# Patient Record
Sex: FEMALE | Race: WHITE | Employment: OTHER | URBAN - METROPOLITAN AREA
[De-identification: names, ages, dates, MRNs, and addresses within clinical notes are randomized per-mention and may not be internally consistent; named-entity substitution may affect disease eponyms.]

---

## 2017-02-25 ENCOUNTER — HOSPITAL ENCOUNTER (OUTPATIENT)
Dept: ULTRASOUND IMAGING | Age: 32
Discharge: HOME | End: 2017-02-25
Attending: INTERNAL MEDICINE | Admitting: INTERNAL MEDICINE

## 2017-02-25 DIAGNOSIS — R10.9 ABDOMINAL PAIN, UNSPECIFIED LOCATION: ICD-10-CM

## 2017-08-07 ENCOUNTER — TELEPHONE (OUTPATIENT)
Dept: PAIN MANAGEMENT | Age: 32
End: 2017-08-07

## 2017-10-04 ENCOUNTER — OFFICE VISIT (OUTPATIENT)
Dept: PAIN MANAGEMENT | Age: 32
End: 2017-10-04

## 2017-10-04 VITALS
DIASTOLIC BLOOD PRESSURE: 62 MMHG | HEIGHT: 66 IN | BODY MASS INDEX: 28.28 KG/M2 | SYSTOLIC BLOOD PRESSURE: 120 MMHG | WEIGHT: 176 LBS

## 2017-10-04 DIAGNOSIS — F51.01 PRIMARY INSOMNIA: ICD-10-CM

## 2017-10-04 DIAGNOSIS — G89.4 CHRONIC PAIN SYNDROME: ICD-10-CM

## 2017-10-04 DIAGNOSIS — M79.7 FIBROMYALGIA: ICD-10-CM

## 2017-10-04 DIAGNOSIS — F39 MOOD DISORDER (HCC): ICD-10-CM

## 2017-10-04 PROCEDURE — 99204 OFFICE O/P NEW MOD 45 MIN: CPT | Performed by: INTERNAL MEDICINE

## 2017-10-04 RX ORDER — AMITRIPTYLINE HYDROCHLORIDE 25 MG/1
25 TABLET, FILM COATED ORAL NIGHTLY
Qty: 30 TABLET | Refills: 0 | Status: SHIPPED | OUTPATIENT
Start: 2017-10-04 | End: 2017-12-13

## 2017-10-04 RX ORDER — OXCARBAZEPINE 300 MG/1
TABLET, FILM COATED ORAL
Qty: 90 TABLET | Refills: 0 | Status: SHIPPED | OUTPATIENT
Start: 2017-10-04 | End: 2017-12-13 | Stop reason: SDUPTHER

## 2017-10-04 RX ORDER — NORETHINDRONE ACETATE AND ETHINYL ESTRADIOL 1.5-30(21)
KIT ORAL DAILY
COMMUNITY
End: 2019-07-17 | Stop reason: SDUPTHER

## 2017-10-04 RX ORDER — TIZANIDINE HYDROCHLORIDE 4 MG/1
CAPSULE, GELATIN COATED ORAL
Qty: 30 CAPSULE | Refills: 0 | Status: SHIPPED | OUTPATIENT
Start: 2017-10-04 | End: 2017-12-13

## 2017-10-04 NOTE — MR AVS SNAPSHOT
After Visit Summary             Tim Mccoy   10/4/2017 3:00 PM   Office Visit    Description:  Female : 1985   Provider:  Tanisha Oliva MD   Department:  [address] [place] [address] [facility]              Your Follow-Up and Future Appointments         Below is a list of your follow-up and future appointments. This may not be a complete list as you may have made appointments directly with providers that we are not aware of or your providers may have made some for you. Please call your providers to confirm appointments. It is important to keep your appointments. Please bring your current insurance card, photo ID, co-pay, and all medication bottles to your appointment. If self-pay, payment is expected at the time of service. Your To-Do List     Follow-Up    Return in about 4 weeks (around 2017). Information from Your Visit        Department     Name Address Phone Fax    [facility] [address] [place] [address] [phone]      You Were Seen for:         Comments    Chronic pain syndrome   [338. 4. ICD-9-CM]         Vital Signs     Blood Pressure Height Weight Breastfeeding? Body Mass Index Smoking Status    120/62 (Site: Right Arm, Position: Sitting) 5' 6\" (1.676 m) 176 lb (79.8 kg) No 28.41 kg/m2 Current Every Day Smoker      Additional Information about your Body Mass Index (BMI)           Your BMI as listed above is considered overweight (25.0-29.9). BMI is an estimate of body fat, calculated from your height and weight. The higher your BMI, the greater your risk of heart disease, high blood pressure, type 2 diabetes, stroke, gallstones, arthritis, sleep apnea, and certain cancers. BMI is not perfect. It may overestimate body fat in athletes and people who are more muscular. If your body fat is high you can improve your BMI by decreasing your calorie intake and becoming more physically active. Learn more at: [employer]             Today's Medication Changes          These changes are accurate as of: 10/4/17  4:50 PM.  If you have any questions, ask your nurse or doctor. START taking these medications           amitriptyline 25 MG tablet   Commonly known as:  ELAVIL   Instructions: Take 1 tablet by mouth nightly   Quantity:  30 tablet   Refills:  0   Started by:  Marko Krishnamurthy MD       milnacipran HCl 25 MG Tabs   Commonly known as:  SAVELLA   Instructions: Take 1 tablet by mouth 2 times daily   Quantity:  60 tablet   Refills:  0   Started by:  Marko Krishnamurthy MD       OXcarbazepine 300 MG tablet   Commonly known as:  TRILEPTAL   Instructions:  One tab hs 1 week, 2 tabs hs 1 week, 1 tab am 2 tabs pm   Quantity:  90 tablet   Refills:  0   Started by:  Marko Krishnamurthy MD       tiZANidine 4 MG capsule   Commonly known as:  ZANAFLEX   Instructions:   Take 1/2 tablet by mouth BID PRN   Quantity:  30 capsule   Refills:  0   Started by:  Marko Krishnamurthy MD         STOP taking these medications           dicyclomine 10 MG capsule   Commonly known as:  BENTYL   Stopped by:  Marko Krishnamurthy MD       famotidine 20 MG tablet   Commonly known as:  PEPCID   Stopped by:  Marko Krishnamurthy MD       gi cocktail   Stopped by:  Marko Krishnamurthy MD       ondansetron 4 MG tablet   Commonly known as:  Jovi Pastel by:  Marko Krishnamurthy MD       sucralfate 1 GM/10ML suspension   Commonly known as:  [facility] by:  Marko Krishnamurthy MD            Where to Get Your Medications      These medications were sent to [facility], [facility] [phone] [facility] [phone]  [address] [place] [address], [address] [place] [address]     Phone:  [phone]     amitriptyline 25 MG tablet    milnacipran HCl 25 MG Tabs    OXcarbazepine 300 MG tablet    tiZANidine 4 MG capsule               Your Current Medications Are norethindrone-ethinyl estradiol-iron (MICROGESTIN FE1.5/30) 1.5-30 MG-MCG tablet Take by mouth daily    tiZANidine (ZANAFLEX) 4 MG capsule Take 1/2 tablet by mouth BID PRN    milnacipran HCl (SAVELLA) 25 MG TABS Take 1 tablet by mouth 2 times daily    amitriptyline (ELAVIL) 25 MG tablet Take 1 tablet by mouth nightly    OXcarbazepine (TRILEPTAL) 300 MG tablet One tab hs 1 week, 2 tabs hs 1 week, 1 tab am 2 tabs pm      Allergies           No Known Allergies      We Ordered/Performed the following           Urine Drug Screen, Comprehensive          Additional Information        Basic Information     Date Of Birth Sex Race Ethnicity Preferred Language    1985 Female White Non-/Non  English      Problem List as of 10/4/2017  Date Reviewed: 3/10/2017                Chronic pain syndrome    Fibromyalgia    Mood disorder ([facility] [address]    Primary insomnia    Generalized anxiety disorder      Immunizations as of 10/4/2017     Name Date    Influenza Whole 11/23/2010      Preventive Care        Date Due    HIV screening is recommended for all people regardless of risk factors  aged 15-65 years at least once (lifetime) who have never been HIV tested. 9/10/2002    Tetanus Combination Vaccine (1 - Tdap) 9/10/2006    Pneumococcal Vaccine - Pneumovax for adults aged 19-64 years with: chronic heart disease, chronic lung disease, diabetes mellitus, alcoholism, chronic liver disease, or cigarette smoking. (1 of 1 - PPSV23) 9/10/2006    Pap Smear 6/13/2015    Yearly Flu Vaccine (1) 9/1/2017            MyChart Signup           Our records indicate that you have declined MyChart signup.

## 2017-10-12 ENCOUNTER — TELEPHONE (OUTPATIENT)
Dept: PAIN MANAGEMENT | Age: 32
End: 2017-10-12

## 2017-10-12 NOTE — TELEPHONE ENCOUNTER
Following up on overdue results for the UDS. The urine sample was collected by [facility] in the office during her last office visit and was forwarded to their lab. RSM will review the results with the patient at her next monthly office visit.

## 2017-11-17 ENCOUNTER — TELEPHONE (OUTPATIENT)
Dept: PAIN MANAGEMENT | Age: 32
End: 2017-11-17

## 2017-11-17 DIAGNOSIS — G89.4 CHRONIC PAIN SYNDROME: ICD-10-CM

## 2017-11-17 DIAGNOSIS — M79.7 FIBROMYALGIA: ICD-10-CM

## 2017-12-13 ENCOUNTER — OFFICE VISIT (OUTPATIENT)
Dept: PAIN MANAGEMENT | Age: 32
End: 2017-12-13

## 2017-12-13 VITALS
DIASTOLIC BLOOD PRESSURE: 89 MMHG | BODY MASS INDEX: 28.41 KG/M2 | HEART RATE: 94 BPM | WEIGHT: 176 LBS | SYSTOLIC BLOOD PRESSURE: 137 MMHG

## 2017-12-13 DIAGNOSIS — M79.7 FIBROMYALGIA: ICD-10-CM

## 2017-12-13 DIAGNOSIS — F41.1 GENERALIZED ANXIETY DISORDER: ICD-10-CM

## 2017-12-13 DIAGNOSIS — F33.1 MAJOR DEPRESSIVE DISORDER, RECURRENT EPISODE, MODERATE (HCC): ICD-10-CM

## 2017-12-13 DIAGNOSIS — G89.4 CHRONIC PAIN SYNDROME: ICD-10-CM

## 2017-12-13 DIAGNOSIS — F51.01 PRIMARY INSOMNIA: ICD-10-CM

## 2017-12-13 PROCEDURE — 99214 OFFICE O/P EST MOD 30 MIN: CPT | Performed by: INTERNAL MEDICINE

## 2017-12-13 RX ORDER — OXCARBAZEPINE 300 MG/1
TABLET, FILM COATED ORAL
Qty: 90 TABLET | Refills: 0 | Status: SHIPPED | OUTPATIENT
Start: 2017-12-13 | End: 2018-07-20

## 2017-12-13 RX ORDER — BUPRENORPHINE 5 UG/H
1 PATCH TRANSDERMAL WEEKLY
Qty: 4 PATCH | Refills: 0 | Status: SHIPPED | OUTPATIENT
Start: 2017-12-13 | End: 2018-02-09 | Stop reason: ALTCHOICE

## 2017-12-13 RX ORDER — NORTRIPTYLINE HYDROCHLORIDE 25 MG/1
25 CAPSULE ORAL NIGHTLY
Qty: 30 CAPSULE | Refills: 0 | Status: SHIPPED | OUTPATIENT
Start: 2017-12-13 | End: 2018-03-14 | Stop reason: SDUPTHER

## 2017-12-13 RX ORDER — VENLAFAXINE HYDROCHLORIDE 75 MG/1
CAPSULE, EXTENDED RELEASE ORAL
Qty: 60 CAPSULE | Refills: 1 | Status: SHIPPED | OUTPATIENT
Start: 2017-12-13 | End: 2018-03-14 | Stop reason: SDUPTHER

## 2017-12-13 NOTE — PROGRESS NOTES
[facility]  1985  [id number]    HISTORY OF PRESENT ILLNESS:  Ms. Nahun Earl is a 29 y.o. female returns for a follow up visit for multiple medical problems. Her current presenting problems are   1. Fibromyalgia    2. Major depressive disorder, recurrent episode, moderate (HCC)    3. Primary insomnia    4. Chronic pain syndrome    5. Generalized anxiety disorder    . As per information/history obtained from the PADT(patient assessment and documentation tool) - She complains of pain in the shoulders Bilateral, elbows Bilateral, upper back, mid back, lower back and knees Bilateral with radiation to the arms Bilateral, hands Bilateral, buttocks, hips Bilateral, upper leg Bilateral, lower leg Bilateral, ankles Bilateral and feet Bilateral She rates the pain 10/10 and describes it as aching. Pain is made worse by: movement, walking. Current treatment regimen has helped relieve about 10% of the pain. She denies side effects from the current pain regimen. Patient reports that since the last follow up visit the physical functioning is worse, family/social relationships are unchanged, mood is unchanged and sleep patterns are unchanged, and that the overall functioning is unchanged. Patient denies neurological bowel or bladder. Patient denies misusing/abusing her narcotic pain medications or using any illegal drugs. There are No indicators for possible drug abuse, addiction or diversion problems. Upon obtaining the medical history from Ms. Nahun Earl regarding today's office visit for her presenting problems, patient states tired taking the medications, makes her sleepy. Ms. Nahun Earl says she wants something for the pain. Not sure if she is complaint with the medications. She mentions it is hard for her to get out of bed. She states that her moods have been depressed, tearful, labile with c/o loss of energy, having occasional crying spells. Denies being suicidal or homicidal.Patient states her sleep is fair.  Has fairly normal sleep latency. Averages about 4-6 hours of sleep a night. Denies any signs of sleep apnea. Feels somewhat rested in the morning. She says she lives with her parents and not working      ALLERGIES/PAST MED/FAM/SOC HISTORY: Ms. Annabelle Huff allergies, past medical, family and social history were reviewed in the chart and pertinent information also listed below. Social History     Social History    Marital status:      Spouse name: N/A    Number of children: N/A    Years of education: N/A     Occupational History    Not on file. Social History Main Topics    Smoking status: Current Every Day Smoker     Packs/day: 1.00     Years: 8.00     Types: Cigarettes    Smokeless tobacco: Never Used    Alcohol use No    Drug use: Yes     Frequency: 10.0 times per week      Comment: has used drugs wont elaborate on usage    Sexual activity: Yes     Partners: Male     Other Topics Concern    Not on file     Social History Narrative    No narrative on file      Ms. Scherer current medications are   Outpatient Medications Prior to Visit   Medication Sig Dispense Refill    norethindrone-ethinyl estradiol-iron (Swati Louie FE1.5/30) 1.5-30 MG-MCG tablet Take by mouth daily      OXcarbazepine (TRILEPTAL) 300 MG tablet One tab hs 1 week, 2 tabs hs 1 week, 1 tab am 2 tabs pm 90 tablet 0    tiZANidine (ZANAFLEX) 4 MG capsule Take 1/2 tablet by mouth BID PRN 30 capsule 0    milnacipran HCl (SAVELLA) 25 MG TABS Take 1 tablet by mouth 2 times daily 60 tablet 0    amitriptyline (ELAVIL) 25 MG tablet Take 1 tablet by mouth nightly 30 tablet 0     No facility-administered medications prior to visit. REVIEW OF SYSTEMS:   Constitutional: Negative for fatigue and unexpected weight change. Eyes: Negative for visual disturbance. Respiratory: Negative for shortness of breath.     Cardiovascular: Negative for chest pain, palpitations  Gastrointestinal: Negative for blood in stool, abdominal distention, nausea, adjusted as below   5. Mood disorder ([facility] [address] - INADEQUATELY CONTROLLED: treatment plan adjusted as below   6. Primary insomnia - INADEQUATELY CONTROLLED: treatment plan adjusted as below       PLAN:   Informed verbal consent regarding treatment plan was obtained from Ms. Nicolasa Lange  -She was advised weight reduction, diet changes- 800-1200 miller diet, diet diary, exercising, nutritional  consult increased physical activity as tolerated  -stretching exercises as advised, swimming if possible  -d/c Savella and start Effexor XR 75 to 150 mg  -d/c Elavil, try Pamelor 25 qhs  -continue with Trileptal, increase to 900 mg  -Discussed use, benefit, and side effects of prescribed medications. Barriers to medication compliance addressed. All patient questions answered. Pt voiced understanding.   -Patient's urine drug screen results with GC/MS confirmation were obtained and reviewed and were negative for any illicit drugs. Prescribed medications were within acceptable range.-1st visit  -start Butrans 5 q7day  -CBT techniques- relaxation therapies such as biofeedback, mindfulness based stress reduction, imagery, cognitive restructuring, problem solving discussed with patient  -Advised patient to quit smoking for  health related concerns and to improve the treatment outcomes. Education was given on quitting smoking and the use of different modalities including medications, hypnotherapy, counselling  and biofeedback. These were discussed with patient.  -Patient was advised to bring in all their medication bottles on the next follow up visit for medication review. Darcie Gonzalesmichaela was seen today for follow-up. Diagnoses and all orders for this visit:    Fibromyalgia  -     buprenorphine (BUTRANS) 5 MCG/HR PTWK; Place 1 patch onto the skin once a week . Major depressive disorder, recurrent episode, moderate (HCC)  -     venlafaxine (EFFEXOR XR) 75 MG extended release capsule;  Take 1 tablet by mouth for a week then BID    Primary and other alternative treatments have been/were  discussed with the patient. Any questions on the  common side effects of these medications were also answered. She was advised against drinking alcohol with the narcotic pain medicines, advised against driving or handling machinery when  starting or adjusting the dose of medicines, feeling groggy or drowsy, or if having any cognitive issues related to the current medications. Sheis fully aware of the risk of overdose and death, if medicines are misused and not taken as prescribed. If she develops new symptoms or if the symptoms worsen, she was told to call the office. Thank you for allowing me to participate in the care of this patient. Jere Wright MD.    Cc: Charles Rizvi MD    I, Nga Messina, scribing for in the presence  of Dr. Jere Wright. 12/13/17  12:45 PM  Gerry Youngblood Assistant  I, Dr. Jere Wright, personally performed the services described in this documentation as scribed by  Nga Messina MA in my presence and it is both accurate and complete

## 2017-12-18 ENCOUNTER — TELEPHONE (OUTPATIENT)
Dept: PAIN MANAGEMENT | Age: 32
End: 2017-12-18

## 2017-12-22 NOTE — TELEPHONE ENCOUNTER
Called pharmacy and spoke to [facility]. She states this was approved with a $0 copay and the pt picked it up a few days ago.

## 2018-01-11 ENCOUNTER — TELEPHONE (OUTPATIENT)
Dept: PAIN MANAGEMENT | Age: 33
End: 2018-01-11

## 2018-02-09 ENCOUNTER — OFFICE VISIT (OUTPATIENT)
Dept: PAIN MANAGEMENT | Age: 33
End: 2018-02-09

## 2018-02-09 VITALS
SYSTOLIC BLOOD PRESSURE: 140 MMHG | BODY MASS INDEX: 30.18 KG/M2 | HEART RATE: 89 BPM | WEIGHT: 187 LBS | DIASTOLIC BLOOD PRESSURE: 96 MMHG

## 2018-02-09 DIAGNOSIS — M79.7 FIBROMYALGIA: ICD-10-CM

## 2018-02-09 DIAGNOSIS — F51.01 PRIMARY INSOMNIA: ICD-10-CM

## 2018-02-09 DIAGNOSIS — G89.4 CHRONIC PAIN SYNDROME: ICD-10-CM

## 2018-02-09 DIAGNOSIS — F39 MOOD DISORDER (HCC): ICD-10-CM

## 2018-02-09 PROCEDURE — 99214 OFFICE O/P EST MOD 30 MIN: CPT | Performed by: INTERNAL MEDICINE

## 2018-02-09 RX ORDER — DIVALPROEX SODIUM 500 MG/1
TABLET, EXTENDED RELEASE ORAL
Qty: 60 TABLET | Refills: 0 | Status: SHIPPED | OUTPATIENT
Start: 2018-02-09 | End: 2018-03-14 | Stop reason: SDUPTHER

## 2018-02-09 RX ORDER — HYDROXYZINE HYDROCHLORIDE 25 MG/1
25 TABLET, FILM COATED ORAL 2 TIMES DAILY
Qty: 60 TABLET | Refills: 0 | Status: SHIPPED | OUTPATIENT
Start: 2018-02-09 | End: 2018-03-11

## 2018-02-25 NOTE — PROGRESS NOTES
have all been reviewed,  initialed and dated and are recorded on appropriate forms in the chart. These forms have been scanned into the \"media\" tab of patients electronic medical record  PHYSICAL EXAM:  Please see the physical exam form for a detailed examination on this visit. This form has been completed and scanned into the  Media section of the chart  This is a young female well-developed well-nourished no apparent acute distress E oriented ×3 mood and affect is normal gait is normal gross motor coordination is normal.  Yanni signs are positive. Back and trunk exam shows tenderness paralumbar region range of motion is within normal limits motor strength over 5 sensory exam is grossly unremarkable straight leg raising is about 60° femoral stretch corinne' test negative. Skin: Warm and dry. Good turgor. No rashes. No lesions or marks noted  Eyes:  PERRLA, cornea/ conjunctiva normal, no nystagmus  HENT:  Atraumatic, external ears normal, nose normal, oropharynx moist, no pharyngeal exudates. Neck- normal range of motion, no tenderness, supple. No bruit, no JVD, No lymph nodes appreciated. Thyroid is not enlarged  Respiratory: Lungs CTAP, No respiratory distress, normal breath sounds, no rales, no wheezing   Cardiovascular:  Normal S1,S2, Normal rate, normal rhythm, no murmurs, no gallops, no rubs   GI:  Soft, nondistended, normal bowel sounds, nontender, no organomegaly, no mass, no rebound, no guarding, obese :  No costovertebral angle tenderness   Musculoskeletal:  No clubbing, no edema, no tenderness, no deformities. There are no varicosities  Lymphatic:  No lymphadenopathy noted   Neurologic:  Alert & oriented x 3, CN 2-12 normal, normal motor function, normal sensory function, no focal deficits noted   Psychiatric:  Speech and behavior appropriate, judgement and thought content normal     There are tender spots of fibromyalgia on physical exam testing    Patient's old records reviewed in detail records from

## 2018-02-26 ENCOUNTER — TELEPHONE (OUTPATIENT)
Dept: PAIN MANAGEMENT | Age: 33
End: 2018-02-26

## 2018-02-26 NOTE — TELEPHONE ENCOUNTER
Note regarding overdue X-Ray ordered on 2/9/18. Per RSM , this testing was requested but not urgent. We will follow up with patient at their next office visit, no follow up is necessary at this time.

## 2018-03-05 ENCOUNTER — HOSPITAL ENCOUNTER (OUTPATIENT)
Dept: NON INVASIVE DIAGNOSTICS | Age: 33
Discharge: HOME | End: 2018-03-05
Attending: INTERNAL MEDICINE | Admitting: INTERNAL MEDICINE

## 2018-03-05 DIAGNOSIS — G89.4 CHRONIC PAIN SYNDROME: ICD-10-CM

## 2018-03-05 DIAGNOSIS — M79.7 FIBROMYALGIA: ICD-10-CM

## 2018-03-06 ENCOUNTER — HOSPITAL ENCOUNTER (OUTPATIENT)
Dept: NON INVASIVE DIAGNOSTICS | Age: 33
Discharge: HOME | End: 2018-03-06
Attending: INTERNAL MEDICINE | Admitting: INTERNAL MEDICINE

## 2018-03-06 DIAGNOSIS — R05.9 COUGH: ICD-10-CM

## 2018-03-14 ENCOUNTER — OFFICE VISIT (OUTPATIENT)
Dept: PAIN MANAGEMENT | Age: 33
End: 2018-03-14

## 2018-03-14 VITALS
WEIGHT: 187 LBS | SYSTOLIC BLOOD PRESSURE: 131 MMHG | HEART RATE: 87 BPM | BODY MASS INDEX: 30.18 KG/M2 | DIASTOLIC BLOOD PRESSURE: 86 MMHG

## 2018-03-14 DIAGNOSIS — M79.7 FIBROMYALGIA: ICD-10-CM

## 2018-03-14 DIAGNOSIS — G89.4 CHRONIC PAIN SYNDROME: ICD-10-CM

## 2018-03-14 PROCEDURE — 99213 OFFICE O/P EST LOW 20 MIN: CPT | Performed by: INTERNAL MEDICINE

## 2018-03-14 RX ORDER — DIVALPROEX SODIUM 500 MG/1
TABLET, EXTENDED RELEASE ORAL
Qty: 60 TABLET | Refills: 0 | Status: SHIPPED | OUTPATIENT
Start: 2018-03-14 | End: 2018-07-20

## 2018-03-14 RX ORDER — VENLAFAXINE HYDROCHLORIDE 75 MG/1
CAPSULE, EXTENDED RELEASE ORAL
Qty: 60 CAPSULE | Refills: 1 | Status: SHIPPED | OUTPATIENT
Start: 2018-03-14 | End: 2018-07-20

## 2018-03-14 RX ORDER — NORTRIPTYLINE HYDROCHLORIDE 25 MG/1
25 CAPSULE ORAL NIGHTLY
Qty: 30 CAPSULE | Refills: 0 | Status: SHIPPED | OUTPATIENT
Start: 2018-03-14 | End: 2018-07-20

## 2018-03-14 NOTE — PROGRESS NOTES
to Visit   Medication Sig Dispense Refill    cetirizine (ZYRTEC ALLERGY) 10 MG tablet Take 1 tablet by mouth daily 10 tablet 0    predniSONE (DELTASONE) 20 MG tablet 2 tab po daily for 4 days then 1 tab po daily for 3 days 11 tablet 0    albuterol sulfate HFA (PROAIR HFA) 108 (90 Base) MCG/ACT inhaler Inhale 2 puffs into the lungs every 6 hours as needed for Wheezing 1 Inhaler 3    divalproex (DEPAKOTE ER) 500 MG extended release tablet Take one tablet po daily for 1 week and then take one tablet Po BID 60 tablet 0    Tens Unit MISC by Does not apply route Use as directed. 1 each 0    cetirizine (ZYRTEC ALLERGY) 10 MG tablet Take 1 tablet by mouth daily 10 tablet 0    predniSONE (DELTASONE) 20 MG tablet 2 tab po daily for 4 days then 1 tab po daily for 3 days 11 tablet 0    albuterol sulfate HFA (VENTOLIN HFA) 108 (90 Base) MCG/ACT inhaler Inhale 2 puffs into the lungs every 6 hours as needed for Wheezing 1 Inhaler 3    OXcarbazepine (TRILEPTAL) 300 MG tablet Take daily 1 tab am 2 tabs pm 90 tablet 0    venlafaxine (EFFEXOR XR) 75 MG extended release capsule Take 1 tablet by mouth for a week then BID 60 capsule 1    nortriptyline (PAMELOR) 25 MG capsule Take 1 capsule by mouth nightly 30 capsule 0    norethindrone-ethinyl estradiol-iron (MICROGESTIN FE1.5/30) 1.5-30 MG-MCG tablet Take by mouth daily       No facility-administered medications prior to visit. SOCIAL/FAMILY/PAST MEDICAL HISTORY: Ms. Nahed Estrada, family and past medical history was reviewed. REVIEW OF SYSTEMS:    Respiratory: Negative for apnea, chest tightness and shortness of breath or change in baseline breathing. Gastrointestinal: Negative for nausea, vomiting, abdominal pain, diarrhea, constipation, blood in stool and abdominal distention. PHYSICAL EXAM:   Nursing note and vitals reviewed. /86   Pulse 87   Wt 187 lb (84.8 kg)   BMI 30.18 kg/m²   Constitutional: She appears well-developed and well-nourished. exercises independently. Ability to do household chores indoor and/or outdoor work and social and leisure activities. Improve psychosocial and physical functioning. - she is showing progression towards this treatment goal with the current regimen. She was advised against drinking alcohol with the narcotic pain medicines, advised against driving or handling machinery while adjusting the dose of medicines or if having cognitive  issues related to the current medications. Risk of overdose and death, if medicines not taken as prescribed, were also discussed. If the patient develops new symptoms or if the symptoms worsen, the patient should call the office. While transcribing every attempt was made to maintain the accuracy of the note in terms of it's contents,there may have been some errors made inadvertently. Thank you for allowing me to participate in the care of this patient.     Mikal Britt MD.    Cc: Sandy Ortega MD    I, Logan Kemp, am scribing for and in the presence of Dr. Mikal Britt.   03/14/18  9:33 AM  LULA Valderrama, Dr. Mikal Britt, personally performed the services described in this documentation as scribed by   Logan Kemp MA in my presence and it is both accurate and complete

## 2018-03-26 ENCOUNTER — HOSPITAL ENCOUNTER (OUTPATIENT)
Dept: OTHER | Age: 33
Discharge: HOME | End: 2018-03-26
Attending: INTERNAL MEDICINE | Admitting: INTERNAL MEDICINE

## 2018-04-16 ENCOUNTER — HOSPITAL ENCOUNTER (OUTPATIENT)
Dept: OTHER | Age: 33
Discharge: HOME | End: 2018-04-16
Attending: INTERNAL MEDICINE | Admitting: INTERNAL MEDICINE

## 2018-04-16 DIAGNOSIS — R94.6 ABNORMAL THYROID EXAM: ICD-10-CM

## 2018-04-16 LAB
BASOPHILS ABSOLUTE: 0.1 K/UL (ref 0–0.2)
BASOPHILS RELATIVE PERCENT: 0.8 %
EOSINOPHILS ABSOLUTE: 0.2 K/UL (ref 0–0.6)
EOSINOPHILS RELATIVE PERCENT: 1.5 %
HCT VFR BLD CALC: 42 % (ref 36–48)
HEMOGLOBIN: 14.4 G/DL (ref 12–16)
LYMPHOCYTES ABSOLUTE: 4.5 K/UL (ref 1–5.1)
LYMPHOCYTES RELATIVE PERCENT: 42.8 %
MCH RBC QN AUTO: 30.1 PG (ref 26–34)
MCHC RBC AUTO-ENTMCNC: 34.2 G/DL (ref 31–36)
MCV RBC AUTO: 88.1 FL (ref 80–100)
MONOCYTES ABSOLUTE: 0.5 K/UL (ref 0–1.3)
MONOCYTES RELATIVE PERCENT: 4.4 %
NEUTROPHILS ABSOLUTE: 5.3 K/UL (ref 1.7–7.7)
NEUTROPHILS RELATIVE PERCENT: 50.5 %
PDW BLD-RTO: 13.2 % (ref 12.4–15.4)
PLATELET # BLD: 340 K/UL (ref 135–450)
PMV BLD AUTO: 8.9 FL (ref 5–10.5)
RBC # BLD: 4.77 M/UL (ref 4–5.2)
T4 FREE: 0.9 NG/DL (ref 0.9–1.8)
TSH SERPL DL<=0.05 MIU/L-ACNC: 2.17 UIU/ML (ref 0.27–4.2)
WBC # BLD: 10.6 K/UL (ref 4–11)

## 2019-05-29 ENCOUNTER — OFFICE VISIT (OUTPATIENT)
Dept: ENT CLINIC | Age: 34
End: 2019-05-29
Payer: MEDICARE

## 2019-05-29 VITALS
HEART RATE: 90 BPM | WEIGHT: 178 LBS | BODY MASS INDEX: 28.61 KG/M2 | TEMPERATURE: 97.7 F | DIASTOLIC BLOOD PRESSURE: 76 MMHG | SYSTOLIC BLOOD PRESSURE: 128 MMHG | HEIGHT: 66 IN

## 2019-05-29 DIAGNOSIS — J35.01 CHRONIC TONSILLITIS: Primary | ICD-10-CM

## 2019-05-29 PROCEDURE — 4004F PT TOBACCO SCREEN RCVD TLK: CPT | Performed by: OTOLARYNGOLOGY

## 2019-05-29 PROCEDURE — G8427 DOCREV CUR MEDS BY ELIG CLIN: HCPCS | Performed by: OTOLARYNGOLOGY

## 2019-05-29 PROCEDURE — 99203 OFFICE O/P NEW LOW 30 MIN: CPT | Performed by: OTOLARYNGOLOGY

## 2019-05-29 PROCEDURE — G8419 CALC BMI OUT NRM PARAM NOF/U: HCPCS | Performed by: OTOLARYNGOLOGY

## 2019-05-29 ASSESSMENT — ENCOUNTER SYMPTOMS
STRIDOR: 0
NAUSEA: 0
EYE PAIN: 0
BLOOD IN STOOL: 0
DIARRHEA: 0
SHORTNESS OF BREATH: 0
SINUS PRESSURE: 0
CHOKING: 0
VOMITING: 0
VOICE CHANGE: 0
CONSTIPATION: 0
EYE DISCHARGE: 0
FACIAL SWELLING: 0
WHEEZING: 0
BACK PAIN: 0
COLOR CHANGE: 0
APNEA: 0
RHINORRHEA: 0
SORE THROAT: 1
COUGH: 0
SINUS PAIN: 0
TROUBLE SWALLOWING: 0
CHEST TIGHTNESS: 0

## 2019-05-29 NOTE — PROGRESS NOTES
Subjective:      Patient ID: Tor Marsh is a 34 y.o. female. HPI  Chief Complaint   Patient presents with    chronic tonsillitis       History of Present Illness  Head/Neck    Comments: This is a pleasant 33 yo female with a 8 motnh history of chronic tonsillitis. Antibiotics every month. States positive monospot tests. Sore throat and odynophagia all the time. White spots on tonsils. Referred from PCP to consider tonsillectomy. Pain: Yes  Location: throat  Onset: As above  Timing: progressive  Quality: throbbing, sharp and burning  Severity: moderate  Frequency: daily  Otalgia: No  Odynophagia: Yes  Dysphagia: No  Dyspnea: No  Voice Changes: No  Lumps in neck: No  Hemoptysis: No  Fever: Yes  Chills: No  Sweats: No  Lethargy: No  Weight Loss: No  Modifying Factors: Smoker and works in [employer]  Definiens Signs and Symptoms: none    Patient Active Problem List   Diagnosis    Chronic pain syndrome    Fibromyalgia    Mood disorder ([facility] [address]    Primary insomnia     No past surgical history on file.   Family History   Problem Relation Age of Onset    Substance Abuse Father      Social History     Socioeconomic History    Marital status:      Spouse name: Not on file    Number of children: Not on file    Years of education: Not on file    Highest education level: Not on file   Occupational History    Not on file   Social Needs    Financial resource strain: Not on file    Food insecurity:     Worry: Not on file     Inability: Not on file    Transportation needs:     Medical: Not on file     Non-medical: Not on file   Tobacco Use    Smoking status: Current Every Day Smoker     Packs/day: 1.00     Years: 8.00     Pack years: 8.00     Types: Cigarettes    Smokeless tobacco: Never Used    Tobacco comment: will talk to pcp when ready to quite    Substance and Sexual Activity    Alcohol use: No    Drug use: Yes     Frequency: 10.0 times per week     Comment: has used drugs wont elaborate on usage    Sexual activity: Yes     Partners: Male   Lifestyle    Physical activity:     Days per week: Not on file     Minutes per session: Not on file    Stress: Not on file   Relationships    Social connections:     Talks on phone: Not on file     Gets together: Not on file     Attends Congregational service: Not on file     Active member of club or organization: Not on file     Attends meetings of clubs or organizations: Not on file     Relationship status: Not on file    Intimate partner violence:     Fear of current or ex partner: Not on file     Emotionally abused: Not on file     Physically abused: Not on file     Forced sexual activity: Not on file   Other Topics Concern    Not on file   Social History Narrative    Not on file         Review of Systems   Constitutional: Negative for activity change, appetite change, chills, fatigue and fever. HENT: Positive for sore throat. Negative for congestion, dental problem, drooling, ear discharge, ear pain, facial swelling, hearing loss, mouth sores, nosebleeds, postnasal drip, rhinorrhea, sinus pressure, sinus pain, sneezing, tinnitus, trouble swallowing and voice change. Eyes: Negative for pain, discharge and visual disturbance. Respiratory: Negative for apnea, cough, choking, chest tightness, shortness of breath, wheezing and stridor. Cardiovascular: Negative for palpitations. Gastrointestinal: Negative for blood in stool, constipation, diarrhea, nausea and vomiting. Endocrine: Negative for cold intolerance, heat intolerance, polydipsia, polyphagia and polyuria. Musculoskeletal: Negative for back pain, gait problem, neck pain and neck stiffness. Skin: Negative for color change, pallor, rash and wound. Allergic/Immunologic: Negative for environmental allergies, food allergies and immunocompromised state. Neurological: Negative for dizziness, facial asymmetry, speech difficulty, light-headedness, numbness and headaches. Hematological: Negative for adenopathy. Does not bruise/bleed easily. Psychiatric/Behavioral: Negative for agitation, confusion, self-injury and sleep disturbance. The patient is not nervous/anxious. Objective:   Physical Exam   Constitutional: She is oriented to person, place, and time. She appears well-developed and well-nourished. HENT:   Head: Normocephalic and atraumatic. Not macrocephalic and not microcephalic. Head is without raccoon's eyes, without Mcqueen's sign, without abrasion, without contusion, without laceration, without right periorbital erythema and without left periorbital erythema. Hair is normal.   Right Ear: Hearing, tympanic membrane and external ear normal. No drainage, swelling or tenderness. No mastoid tenderness. Tympanic membrane is not perforated, not retracted and not bulging. Tympanic membrane mobility is normal. No middle ear effusion. No decreased hearing is noted. Left Ear: Hearing, tympanic membrane and external ear normal. No drainage, swelling or tenderness. No mastoid tenderness. Tympanic membrane is not perforated, not retracted and not bulging. Tympanic membrane mobility is normal.  No middle ear effusion. No decreased hearing is noted. Nose: No mucosal edema, rhinorrhea, nose lacerations, sinus tenderness, nasal deformity, septal deviation or nasal septal hematoma. No epistaxis. No foreign bodies. Right sinus exhibits no maxillary sinus tenderness and no frontal sinus tenderness. Left sinus exhibits no maxillary sinus tenderness and no frontal sinus tenderness. Mouth/Throat: Uvula is midline. Mucous membranes are not pale, not dry and not cyanotic. No oral lesions. No trismus in the jaw. Normal dentition. No dental abscesses, uvula swelling, lacerations or dental caries. Oropharyngeal exudate, posterior oropharyngeal edema and posterior oropharyngeal erythema present. No tonsillar abscesses.        Eyes: Lids are normal. Lids are everted and swept, no foreign bodies found. Right eye exhibits no chemosis, no discharge and no exudate. Left eye exhibits no chemosis, no discharge and no exudate. Right eye exhibits normal extraocular motion and no nystagmus. Left eye exhibits normal extraocular motion and no nystagmus. Neck: Trachea normal. Neck supple. Normal carotid pulses present. No tracheal deviation present. No thyroid mass and no thyromegaly present. Cardiovascular: Normal rate and regular rhythm. Pulmonary/Chest: No stridor. No apnea, no tachypnea and no bradypnea. No respiratory distress. Musculoskeletal:        Right shoulder: She exhibits normal range of motion. Left shoulder: She exhibits normal range of motion. Lymphadenopathy:        Head (right side): No submental, no submandibular, no tonsillar, no preauricular, no posterior auricular and no occipital adenopathy present. Head (left side): No submental, no submandibular, no tonsillar, no preauricular, no posterior auricular and no occipital adenopathy present. Right cervical: No superficial cervical, no deep cervical and no posterior cervical adenopathy present. Left cervical: No superficial cervical, no deep cervical and no posterior cervical adenopathy present. Neurological: She is alert and oriented to person, place, and time. Skin: No bruising, no laceration and no lesion noted. No erythema. Psychiatric: She has a normal mood and affect. Her speech is normal and behavior is normal.       Assessment:       Diagnosis Orders   1. Chronic tonsillitis             Plan:      Failed medical therapy. Meetes AAOA criteria for chronic tonsillitis and tonsillectomy. OR for tonsillectomy, possible adenoidectomy    The patient has a diagnosis of chronic tonsillitis with hypertrophy which has not been responsive or cannot be treated with maximal medical therapy. The patient has been advised of options including further medical therapy, surgery, or nothing.   The risks and benefits of surgery were described not limited to post op tonsil bleed and dehydration. Questions were answered and the patient agreed to proceed with surgery which will be scheduled in an appropriate time frame in line with the severity of disease.           Andre Gabriel MD

## 2019-06-12 NOTE — PROGRESS NOTES
[facility] [employer] / [facility] [place] ([facility]) [address] [facility], [address]    Acknowledgment of Informed Consent for Surgical or Medical Procedure and Sedation  I agree to allow doctor(s) YUE Lobo and his/her associates or assistants, including residents and/or other qualified medical practitioner to perform the following medical treatment or procedure and to administer or direct the administration of sedation as necessary:  Procedure(s): [address]  My doctor has explained the following regarding the proposed procedure:   the explanation of the procedure   the benefits of the procedure   the potential problems that might occur during recuperation   the risks and side effects of the procedure which could include but are not limited to severe blood loss, infection, stroke or death   the benefits, risks and side effect of alternative procedures including the consequences of declining this procedure or any alternative procedures   the likelihood of achieving satisfactory results. I acknowledge no guarantee or assurance has been made to me regarding the results. I understand that during the course of this treatment/procedure, unforeseen conditions can occur which require an additional or different procedure. I agree to allow my physician or assistants to perform such extension of the original procedure as they may find necessary. I understand that sedation will often result in temporary impairment of memory and fine motor skills and that sedation can occasionally progress to a state of deep sedation or general anesthesia. I understand the risks of anesthesia for surgery include, but are not limited to, sore throat, hoarseness, injury to face, mouth, or teeth; nausea; headache; injury to blood vessels or nerves; death, brain damage, or paralysis.     I understand that if I have a Limitation of Treatment order in effect during my hospitalization, the order may or may not be in effect during this procedure. I give my doctor permission to give me blood or blood products. I understand that there are risks with receiving blood such as hepatitis, AIDS, fever, or allergic reaction. I acknowledge that the risks, benefits, and alternatives of this treatment have been explained to me and that no express or implied warranty has been given by the hospital, any blood bank, or any person or entity as to the blood or blood components transfused. At the discretion of my doctor, I agree to allow observers, equipment/product representatives and allow photographing, and/or televising of the procedure, provided my name or identity is maintained confidentially. I agree the hospital may dispose of or use for scientific or educational purposes any tissue, fluid, or body parts which may be removed.     ________________________________Date________Time______ am/pm  ([place])  Patient or Signature of Closest Relative or Legal Guardian    ________________________________Date________Time______am/pm      Page 1 of  1  Witness

## 2019-06-20 ENCOUNTER — ANESTHESIA EVENT (OUTPATIENT)
Dept: OPERATING ROOM | Age: 34
End: 2019-06-20
Payer: MEDICARE

## 2019-06-20 RX ORDER — VENLAFAXINE 75 MG/1
75 TABLET ORAL DAILY
COMMUNITY
End: 2021-01-29

## 2019-06-20 NOTE — PROGRESS NOTES
[address]                          Date of Procedure 6/21/19 Time of Procedure per surgeon    PRIOR TO PROCEDURE DATE:  1. Please follow any guidelines/instructions prior to your procedure as advised by your surgeon. 2. Arrange for someone to drive you home and be with you for the first 24 hours after discharge for your safety after your procedure for which you received sedation. Ensure it is someone we can share information with regarding your discharge. 3. You must contact your surgeon for instructions IF:   You are taking any blood thinners, aspirin, anti-inflammatory or vitamin E.   There is a change in your physical condition such as a cold, fever, rash, cuts, sores or any other infection, especially near your surgical site. 4. Do not drink alcohol the day before or day of your procedure. 5. A Pre-op History and Physical for surgery MUST be completed by your Physician or Urgent Care within 30 days of your procedure date. Please bring a copy with you on the day of your procedure and along with any other testing performed. THE DAY OF YOUR PROCEDURE:  1. Follow instructions for ARRIVAL TIME as DIRECTED BY YOUR SURGEON. If your surgeon does not give you a specific arrival time, please arrive at per surgeon. 2. Enter the MAIN entrance from [employer] and follow the signs to the free [employer] or [employer] parking (offered free of charge 6am-5pm). 3. Enter the Main Entrance of the hospital (do not enter from the lower level of the parking garage). Upon entrance, check in with the  at the main desk on your left. If no one is available at the desk, proceed into the [facility] Waiting Room and go through the door directly into the [facility]. There is a Check-in desk ACROSS from Room 5 (marked with a sign hanging from the ceiling). The phone number for the surgery center is [phone].     4. Please call [phone] option #2 option #2 if you have not been preregistered yet. On the day of your procedure bring your insurance card and photo ID. You will be registered at your bedside once brought back to your room. 5. DO NOT EAT ANYTHING eight hours prior to surgery. May have 8 ounces of water 4 hours prior to surgery. 6. MEDICATIONS    Take the following medications with a SMALL sip of water: Effexor   Use your usual dose of inhalers the morning of surgery. BRING your rescue inhaler with you to hospital.    Anesthesia does NOT want you to take insulin the morning of surgery. They will control your blood sugar while you are at the hospital. Please contact your ordering physician for instructions regarding your insulin the night before your procedure. If you have an insulin pump, please keep it set on basal rate. 7. Do not swallow water when brushing teeth. No gum, candy, mints or ice chips. Refrain from smoking or at least decrease the amount. 8. Dress in loose, comfortable clothing appropriate for redressing after your procedure. Do not wear jewelry (including body piercings), make-up (especially NO eye make-up), fingernail polish (NO toenail polish if foot/leg surgery), lotion, powders or metal hairclips. 9. Dentures, glasses, or contacts will need to be removed before your procedure. Bring cases for your glasses, contacts, dentures, or hearing aids to protect them while you are in surgery. 10. If you use a CPAP, please bring it with you on the day of your procedure. 11. We recommend that valuable personal  belongings such as cash, cell phones, e-tablets or jewelry, be left at home during your stay. The hospital will not be responsible for valuables that are not secured in the hospital safe. However, if your insurance requires a co-pay, you may want to bring a method of payment, i.e. Check or credit card, if you wish to pay your co-pay the day of surgery.       12. If you are to stay overnight, you may bring a bag with personal items. Please have any large items you may need brought in by your family after your arrival to your hospital room. 15. If you have a Living Will or Durable Power of , please bring a copy on the day of your procedure. 15. With your permission, one family member may accompany you while you are being prepared for surgery. Once you are ready, additional family members may join you. HOW WE KEEP YOU SAFE and WORK TO PREVENT SURGICAL SITE INFECTIONS:  1. Health care workers should always check your ID bracelet to verify your name and birth date. You will be asked many times to state your name, date of birth, and allergies. 2. Health care workers should always clean their hands with soap or alcohol gel before providing care to you. It is okay to ask anyone if they cleaned their hands before they touch you. 3. You will be actively involved in verifying the type of procedure you are having and ensuring the correct surgical site. This will be confirmed multiple times prior to your procedure. Do NOT margot your surgery site UNLESS instructed to by your surgeon. 4. Do not shave or wax for 72 hours prior to procedure near your operative site. Shaving with a razor can irritate your skin and make it easier to develop an infection. On the day of your procedure, any hair that needs to be removed near the surgical site will be clipped by a healthcare worker using a special clippers designed to avoid skin irritation. 5. When you are in the operating room, your surgical site will be cleansed with a special soap, and in most cases, you will be given an antibiotic before the surgery begins. What to expect AFTER YOUR PROCEDURE:  1. Immediately following your procedure, your will be taken to the PACU for the first phase of your recovery.   Your nurse will help you recover from any potential side effects of anesthesia, such as extreme drowsiness, changes in your vital signs or breathing patterns. Nausea, headache, muscle aches, or sore throat may also occur after anesthesia. Your nurse will help you manage these potential side effects. 2. For comfort and safety, arrange to have someone at home with you for the first 24 hours after discharge. 3. You and your family will be given written instructions about your diet, activity, dressing care, medications, and return visits. 4. Once at home, should issues with nausea, pain, or bleeding occur, or should you notice any signs of infection, you should call your surgeon. 5. Always clean your hands before and after caring for your wound. Do not let your family touch your surgery site without cleaning their hands. 6. Narcotic pain medications can cause significant constipation. You may want to add a stool softener to your postoperative medication schedule or speak to your surgeon on how best to manage this SIDE EFFECT. SPECIAL INSTRUCTIONS    Thank you for allowing us to care for you. We strive to exceed your expectations in the delivery of care and service provided to you and your family. If you need to contact us for any reason, please call us at [phone]    Instructions reviewed with patient during preadmission testing phone interview. Abril He. 6/20/2019 .1:30 PM      ADDITIONAL EDUCATIONAL INFORMATION REVIEWED PER PHONE WITH YOU AND/OR YOUR FAMILY:  Yes Bring a urine sample on day of surgery  No Pain Goal-Taking Control of Your Pain  No FAQs about Surgical Site Infections  No Hibiclens® Bathing Instructions   Yes Antibacterial Soap  No Jean-Pierre® Wipes Bathing Instructions (Obtained from: [email or web address]://www.[employer]/. pdf )  No Incentive Spirometer Education  No Other

## 2019-06-21 ENCOUNTER — ANESTHESIA (OUTPATIENT)
Dept: OPERATING ROOM | Age: 34
End: 2019-06-21
Payer: MEDICARE

## 2019-06-21 ENCOUNTER — HOSPITAL ENCOUNTER (OUTPATIENT)
Age: 34
Setting detail: OUTPATIENT SURGERY
Discharge: HOME | End: 2019-06-21
Attending: OTOLARYNGOLOGY | Admitting: OTOLARYNGOLOGY
Payer: MEDICARE

## 2019-06-21 VITALS
DIASTOLIC BLOOD PRESSURE: 104 MMHG | OXYGEN SATURATION: 97 % | HEIGHT: 66 IN | SYSTOLIC BLOOD PRESSURE: 150 MMHG | TEMPERATURE: 97.6 F | RESPIRATION RATE: 18 BRPM | WEIGHT: 178 LBS | HEART RATE: 76 BPM | BODY MASS INDEX: 28.61 KG/M2

## 2019-06-21 VITALS — SYSTOLIC BLOOD PRESSURE: 120 MMHG | DIASTOLIC BLOOD PRESSURE: 75 MMHG | OXYGEN SATURATION: 92 % | TEMPERATURE: 96.8 F

## 2019-06-21 DIAGNOSIS — G89.18 POST-OP PAIN: Primary | ICD-10-CM

## 2019-06-21 LAB — PREGNANCY, URINE: NEGATIVE

## 2019-06-21 PROCEDURE — 3700000001 HC ADD 15 MINUTES (ANESTHESIA): Performed by: OTOLARYNGOLOGY

## 2019-06-21 PROCEDURE — 88304 TISSUE EXAM BY PATHOLOGIST: CPT

## 2019-06-21 PROCEDURE — 2580000003 HC RX 258: Performed by: ANESTHESIOLOGY

## 2019-06-21 PROCEDURE — 3600000002 HC SURGERY LEVEL 2 BASE: Performed by: OTOLARYNGOLOGY

## 2019-06-21 PROCEDURE — 2709999900 HC NON-CHARGEABLE SUPPLY: Performed by: OTOLARYNGOLOGY

## 2019-06-21 PROCEDURE — 7100000010 HC PHASE II RECOVERY - FIRST 15 MIN: Performed by: OTOLARYNGOLOGY

## 2019-06-21 PROCEDURE — 7100000001 HC PACU RECOVERY - ADDTL 15 MIN: Performed by: OTOLARYNGOLOGY

## 2019-06-21 PROCEDURE — 6360000002 HC RX W HCPCS: Performed by: NURSE ANESTHETIST, CERTIFIED REGISTERED

## 2019-06-21 PROCEDURE — 3700000000 HC ANESTHESIA ATTENDED CARE: Performed by: OTOLARYNGOLOGY

## 2019-06-21 PROCEDURE — 7100000000 HC PACU RECOVERY - FIRST 15 MIN: Performed by: OTOLARYNGOLOGY

## 2019-06-21 PROCEDURE — 6360000002 HC RX W HCPCS: Performed by: ANESTHESIOLOGY

## 2019-06-21 PROCEDURE — 6370000000 HC RX 637 (ALT 250 FOR IP): Performed by: OTOLARYNGOLOGY

## 2019-06-21 PROCEDURE — 2500000003 HC RX 250 WO HCPCS: Performed by: NURSE ANESTHETIST, CERTIFIED REGISTERED

## 2019-06-21 PROCEDURE — 42826 REMOVAL OF TONSILS: CPT | Performed by: OTOLARYNGOLOGY

## 2019-06-21 PROCEDURE — 3600000012 HC SURGERY LEVEL 2 ADDTL 15MIN: Performed by: OTOLARYNGOLOGY

## 2019-06-21 PROCEDURE — 2580000003 HC RX 258: Performed by: OTOLARYNGOLOGY

## 2019-06-21 PROCEDURE — 84703 CHORIONIC GONADOTROPIN ASSAY: CPT

## 2019-06-21 PROCEDURE — 7100000011 HC PHASE II RECOVERY - ADDTL 15 MIN: Performed by: OTOLARYNGOLOGY

## 2019-06-21 RX ORDER — OXYCODONE HYDROCHLORIDE 5 MG/1
5 TABLET ORAL
Status: COMPLETED | OUTPATIENT
Start: 2019-06-21 | End: 2019-06-21

## 2019-06-21 RX ORDER — FENTANYL CITRATE 50 UG/ML
25 INJECTION, SOLUTION INTRAMUSCULAR; INTRAVENOUS EVERY 5 MIN PRN
Status: DISCONTINUED | OUTPATIENT
Start: 2019-06-21 | End: 2019-06-21 | Stop reason: HOSPADM

## 2019-06-21 RX ORDER — PROPOFOL 10 MG/ML
INJECTION, EMULSION INTRAVENOUS PRN
Status: DISCONTINUED | OUTPATIENT
Start: 2019-06-21 | End: 2019-06-21 | Stop reason: SDUPTHER

## 2019-06-21 RX ORDER — LIDOCAINE HYDROCHLORIDE 10 MG/ML
1 INJECTION, SOLUTION EPIDURAL; INFILTRATION; INTRACAUDAL; PERINEURAL
Status: DISCONTINUED | OUTPATIENT
Start: 2019-06-21 | End: 2019-06-21 | Stop reason: HOSPADM

## 2019-06-21 RX ORDER — SODIUM CHLORIDE 0.9 % (FLUSH) 0.9 %
10 SYRINGE (ML) INJECTION EVERY 12 HOURS SCHEDULED
Status: DISCONTINUED | OUTPATIENT
Start: 2019-06-21 | End: 2019-06-21 | Stop reason: HOSPADM

## 2019-06-21 RX ORDER — MAGNESIUM HYDROXIDE 1200 MG/15ML
LIQUID ORAL CONTINUOUS PRN
Status: COMPLETED | OUTPATIENT
Start: 2019-06-21 | End: 2019-06-21

## 2019-06-21 RX ORDER — MIDAZOLAM HYDROCHLORIDE 1 MG/ML
INJECTION INTRAMUSCULAR; INTRAVENOUS PRN
Status: DISCONTINUED | OUTPATIENT
Start: 2019-06-21 | End: 2019-06-21 | Stop reason: SDUPTHER

## 2019-06-21 RX ORDER — SODIUM CHLORIDE 0.9 % (FLUSH) 0.9 %
10 SYRINGE (ML) INJECTION PRN
Status: DISCONTINUED | OUTPATIENT
Start: 2019-06-21 | End: 2019-06-21 | Stop reason: HOSPADM

## 2019-06-21 RX ORDER — FENTANYL CITRATE 50 UG/ML
50 INJECTION, SOLUTION INTRAMUSCULAR; INTRAVENOUS EVERY 5 MIN PRN
Status: DISCONTINUED | OUTPATIENT
Start: 2019-06-21 | End: 2019-06-21 | Stop reason: HOSPADM

## 2019-06-21 RX ORDER — OXYCODONE HYDROCHLORIDE 5 MG/1
5 TABLET ORAL EVERY 6 HOURS PRN
Qty: 30 TABLET | Refills: 0 | Status: SHIPPED | OUTPATIENT
Start: 2019-06-21 | End: 2019-06-28

## 2019-06-21 RX ORDER — DEXAMETHASONE SODIUM PHOSPHATE 4 MG/ML
INJECTION, SOLUTION INTRA-ARTICULAR; INTRALESIONAL; INTRAMUSCULAR; INTRAVENOUS; SOFT TISSUE PRN
Status: DISCONTINUED | OUTPATIENT
Start: 2019-06-21 | End: 2019-06-21 | Stop reason: SDUPTHER

## 2019-06-21 RX ORDER — ROCURONIUM BROMIDE 10 MG/ML
INJECTION, SOLUTION INTRAVENOUS PRN
Status: DISCONTINUED | OUTPATIENT
Start: 2019-06-21 | End: 2019-06-21 | Stop reason: SDUPTHER

## 2019-06-21 RX ORDER — PREDNISONE 10 MG/1
40 TABLET ORAL DAILY
Qty: 20 TABLET | Refills: 0 | Status: SHIPPED | OUTPATIENT
Start: 2019-06-21 | End: 2019-06-26

## 2019-06-21 RX ORDER — SODIUM CHLORIDE, SODIUM LACTATE, POTASSIUM CHLORIDE, CALCIUM CHLORIDE 600; 310; 30; 20 MG/100ML; MG/100ML; MG/100ML; MG/100ML
INJECTION, SOLUTION INTRAVENOUS CONTINUOUS
Status: DISCONTINUED | OUTPATIENT
Start: 2019-06-21 | End: 2019-06-21 | Stop reason: HOSPADM

## 2019-06-21 RX ORDER — LIDOCAINE HYDROCHLORIDE 20 MG/ML
INJECTION, SOLUTION INTRAVENOUS PRN
Status: DISCONTINUED | OUTPATIENT
Start: 2019-06-21 | End: 2019-06-21 | Stop reason: SDUPTHER

## 2019-06-21 RX ORDER — ONDANSETRON 2 MG/ML
4 INJECTION INTRAMUSCULAR; INTRAVENOUS
Status: COMPLETED | OUTPATIENT
Start: 2019-06-21 | End: 2019-06-21

## 2019-06-21 RX ORDER — FENTANYL CITRATE 50 UG/ML
INJECTION, SOLUTION INTRAMUSCULAR; INTRAVENOUS PRN
Status: DISCONTINUED | OUTPATIENT
Start: 2019-06-21 | End: 2019-06-21 | Stop reason: SDUPTHER

## 2019-06-21 RX ORDER — ONDANSETRON 2 MG/ML
INJECTION INTRAMUSCULAR; INTRAVENOUS PRN
Status: DISCONTINUED | OUTPATIENT
Start: 2019-06-21 | End: 2019-06-21 | Stop reason: SDUPTHER

## 2019-06-21 RX ADMIN — DEXAMETHASONE SODIUM PHOSPHATE 12 MG: 4 INJECTION, SOLUTION INTRAMUSCULAR; INTRAVENOUS at 08:33

## 2019-06-21 RX ADMIN — SODIUM CHLORIDE, SODIUM LACTATE, POTASSIUM CHLORIDE, AND CALCIUM CHLORIDE: 600; 310; 30; 20 INJECTION, SOLUTION INTRAVENOUS at 07:11

## 2019-06-21 RX ADMIN — HYDROMORPHONE HYDROCHLORIDE 0.5 MG: 1 INJECTION, SOLUTION INTRAMUSCULAR; INTRAVENOUS; SUBCUTANEOUS at 09:41

## 2019-06-21 RX ADMIN — HYDROMORPHONE HYDROCHLORIDE 0.5 MG: 1 INJECTION, SOLUTION INTRAMUSCULAR; INTRAVENOUS; SUBCUTANEOUS at 09:26

## 2019-06-21 RX ADMIN — PROPOFOL 50 MG: 10 INJECTION, EMULSION INTRAVENOUS at 08:45

## 2019-06-21 RX ADMIN — ROCURONIUM BROMIDE 50 MG: 10 INJECTION, SOLUTION INTRAVENOUS at 08:33

## 2019-06-21 RX ADMIN — MIDAZOLAM HYDROCHLORIDE 2 MG: 2 INJECTION, SOLUTION INTRAMUSCULAR; INTRAVENOUS at 08:30

## 2019-06-21 RX ADMIN — OXYCODONE HYDROCHLORIDE 5 MG: 5 TABLET ORAL at 10:33

## 2019-06-21 RX ADMIN — LIDOCAINE HYDROCHLORIDE 100 MG: 20 INJECTION, SOLUTION INTRAVENOUS at 08:33

## 2019-06-21 RX ADMIN — ONDANSETRON 4 MG: 2 INJECTION INTRAMUSCULAR; INTRAVENOUS at 09:57

## 2019-06-21 RX ADMIN — SODIUM CHLORIDE, SODIUM LACTATE, POTASSIUM CHLORIDE, AND CALCIUM CHLORIDE: 600; 310; 30; 20 INJECTION, SOLUTION INTRAVENOUS at 08:58

## 2019-06-21 RX ADMIN — PROPOFOL 150 MG: 10 INJECTION, EMULSION INTRAVENOUS at 08:33

## 2019-06-21 RX ADMIN — FENTANYL CITRATE 50 MCG: 50 INJECTION INTRAMUSCULAR; INTRAVENOUS at 08:45

## 2019-06-21 RX ADMIN — FENTANYL CITRATE 50 MCG: 50 INJECTION INTRAMUSCULAR; INTRAVENOUS at 08:33

## 2019-06-21 RX ADMIN — ONDANSETRON 4 MG: 2 INJECTION INTRAMUSCULAR; INTRAVENOUS at 08:42

## 2019-06-21 RX ADMIN — SUGAMMADEX 200 MG: 100 INJECTION, SOLUTION INTRAVENOUS at 08:59

## 2019-06-21 ASSESSMENT — PULMONARY FUNCTION TESTS
PIF_VALUE: 20
PIF_VALUE: 20
PIF_VALUE: 0
PIF_VALUE: 19
PIF_VALUE: 17
PIF_VALUE: 20
PIF_VALUE: 20
PIF_VALUE: 19
PIF_VALUE: 19
PIF_VALUE: 20
PIF_VALUE: 23
PIF_VALUE: 19
PIF_VALUE: 20
PIF_VALUE: 1
PIF_VALUE: 2
PIF_VALUE: 19
PIF_VALUE: 31
PIF_VALUE: 25
PIF_VALUE: 38
PIF_VALUE: 19
PIF_VALUE: 24
PIF_VALUE: 20
PIF_VALUE: 22
PIF_VALUE: 20
PIF_VALUE: 22
PIF_VALUE: 35
PIF_VALUE: 20
PIF_VALUE: 25
PIF_VALUE: 0
PIF_VALUE: 22
PIF_VALUE: 20
PIF_VALUE: 2
PIF_VALUE: 0
PIF_VALUE: 20
PIF_VALUE: 2
PIF_VALUE: 20
PIF_VALUE: 20

## 2019-06-21 ASSESSMENT — PAIN DESCRIPTION - PROGRESSION
CLINICAL_PROGRESSION: GRADUALLY WORSENING
CLINICAL_PROGRESSION: NOT CHANGED
CLINICAL_PROGRESSION: GRADUALLY IMPROVING

## 2019-06-21 ASSESSMENT — PAIN SCALES - GENERAL
PAINLEVEL_OUTOF10: 10
PAINLEVEL_OUTOF10: 10
PAINLEVEL_OUTOF10: 6
PAINLEVEL_OUTOF10: 8

## 2019-06-21 ASSESSMENT — PAIN DESCRIPTION - PAIN TYPE
TYPE: SURGICAL PAIN

## 2019-06-21 ASSESSMENT — PAIN DESCRIPTION - ONSET: ONSET: ON-GOING

## 2019-06-21 ASSESSMENT — PAIN - FUNCTIONAL ASSESSMENT: PAIN_FUNCTIONAL_ASSESSMENT: 0-10

## 2019-06-21 ASSESSMENT — PAIN DESCRIPTION - ORIENTATION
ORIENTATION: RIGHT;LEFT
ORIENTATION: RIGHT;LEFT

## 2019-06-21 ASSESSMENT — PAIN DESCRIPTION - LOCATION
LOCATION: THROAT

## 2019-06-21 ASSESSMENT — PAIN DESCRIPTION - DESCRIPTORS
DESCRIPTORS: ACHING;DISCOMFORT
DESCRIPTORS: ACHING;DISCOMFORT

## 2019-06-21 ASSESSMENT — LIFESTYLE VARIABLES: SMOKING_STATUS: 1

## 2019-06-21 ASSESSMENT — PAIN DESCRIPTION - FREQUENCY
FREQUENCY: CONTINUOUS
FREQUENCY: INTERMITTENT

## 2019-06-21 NOTE — PROGRESS NOTES
PACU Transfer to [facility]    Vitals:    06/21/19 1000   BP: (!) 142/84 B/P meets darron discharge criteria   Pulse: 85   Resp: 18   Temp: 97.2 °F (36.2 °C)   SpO2: 100%         Intake/Output Summary (Last 24 hours) at 6/21/2019 1017  Last data filed at 6/21/2019 0858  Gross per 24 hour   Intake 1000 ml   Output --   Net 1000 ml       Pain assessment:  receiving treatment  Pain Level: 6 States pain is much better then before    Patient transferred to care of [facility] RN.    6/21/2019 10:17 AM

## 2019-06-21 NOTE — OP NOTE
[address] [facility] SURGERY  OPERATIVE REPORT    Patient Name: Tierra Jiménez  YOB: 1985  Medical Record Number:  [id number]  Billing Number:  [id number]  Date of Procedure: 6/21/19  Time: 0830      Pre Operative Diagnoses: 1) Chronic tonsillitis. Post Operative Diagnoses: Same as above. Procedure: 1) Tonsillectomy (13311)  Surgeon: Raj Roque MD   Assistant: none  Anesthesia: General anesthesia. Findings: 1) Chronically infected tonsils with tonsiliths   Indications: The patient is a is a now 34 female with a history of chronic tonsillitis. Description: After verification of informed consent, the patient was brought to the operating room and placed in the supine position. General endotracheal anesthesia was induced. The bed was then turned, a shoulder roll placed, and a John-Ronny mouth guard inserted and suspended from the [facility] stand. An suction catheter was placed through the naris and the palate retracted with palpation showing no evidence of submucous cleft. An Allis clamp was then used with Bovie electrocautery on 20 ellis spray to grasp and resect the right tonsil from the superior down to the inferior pole. The left tonsil was grasped at the superior pole and Bovie electrocautery used to resect this from superior to inferior. The stomach was then suctioned, tonsillar fossae re-evaluated and spot cautery employed at 35 ellis spray as indicated, and the patient turned back to the care of Anesthesia to recover. The patient tolerated the procedure well and was transferred to the recovery room in stable condition. Dr. Reza Salas was present through the essential portions of the procedure and involved in all medical decision-making. Specimens: bilateral tonsils   Estimated Blood Loss: 10 cc  Complications: none  I attest that I was present for and did the entire procedure myself.

## 2019-06-21 NOTE — PROGRESS NOTES
Patient arrived from OR to PACU # 15 s/p TONSILLECTOMY per . Attached to PACU monitoring device, report received from CRNA who reported no problems intraoperatively, VSS. Patient restless/anxious on arrival, trying to sit up & move all over in stretcher. Oral airway partially hanging out of mouth, removed and nasal trumpet later removed. Placed on 15liters Face tent. Continue to monitor.

## 2019-06-21 NOTE — ANESTHESIA PRE PROCEDURE
Department of Anesthesiology  Preprocedure Note       Name:  Giuliana Rao   Age:  34 y.o.  :  1985                                          MRN:  [id number]         Date:  2019      Surgeon: Hari Kim):  Sarah Sinclair MD    Procedure: TONSILLECTOMY WITH POSSIBLE ADENOIDECTOMY (N/A )    Medications prior to admission:   Prior to Admission medications    Medication Sig Start Date End Date Taking?  Authorizing Provider   venlafaxine (EFFEXOR) 75 MG tablet Take 75 mg by mouth daily   Yes Historical Provider, MD   albuterol sulfate  (90 Base) MCG/ACT inhaler Inhale 2 puffs into the lungs 4 times daily as needed for Wheezing 19  Yes Vicky Hauser MD   cetirizine (ZYRTEC ALLERGY) 10 MG tablet Take 1 tablet by mouth daily 18  Yes Vicky Hauser MD   fluticasone (FLONASE) 50 MCG/ACT nasal spray 1 spray by Nasal route daily 3/15/18  Yes Vicky Hauser MD   norethindrone-ethinyl estradiol-iron (Navi Chato FE) 1.5-30 MG-MCG tablet Take by mouth daily   Yes Historical Provider, MD   TRI-LO-ANU 0.18/0.215/0.25 MG-25 MCG TABS TAKE ONE TABLET BY MOUTH DAILY AS DIRECTED 19   Vicky Hauser MD   predniSONE (DELTASONE) 20 MG tablet 2 tab po daily for 4 days then 1 tab po daily for 3 days 19   Vicky Hauser MD   predniSONE (DELTASONE) 20 MG tablet 2 tab po daily for 4 days then 1 tab po daily for 3 days 19   Vicky Hauser MD   predniSONE (DELTASONE) 20 MG tablet 2 tab po daily for 4 days then 1 tab po daily for 3 days 19   Vicky Hauser MD   cetirizine (ZYRTEC ALLERGY) 10 MG tablet Take 1 tablet by mouth daily 19   Vicky Hauser MD   cetirizine (ZYRTEC ALLERGY) 10 MG tablet Take 1 tablet by mouth daily 19   Vicky Hauser MD   cetirizine (ZYRTEC ALLERGY) 10 MG tablet Take 1 tablet by mouth daily 19   Vicky Hauser MD   cetirizine (ZYRTEC ALLERGY) 10 MG tablet Take 1 tablet by mouth daily 18 Meghan Mayfield MD   cetirizine (ZYRTEC ALLERGY) 10 MG tablet Take 1 tablet by mouth daily 9/17/18   Meghan Mayfield MD   Norgestim-Eth Estrad Triphasic 0.18/0.215/0.25 MG-25 MCG TABS Daily as directed 9/17/18   Meghan Mayfield MD   medroxyPROGESTERone (PROVERA) 10 MG tablet Take 1 tablet by mouth daily 7/26/18   Meghan Mayfield MD       Current medications:    Current Facility-Administered Medications   Medication Dose Route Frequency Provider Last Rate Last Dose    lactated ringers infusion   Intravenous Continuous Hannah Rose  mL/hr at 06/21/19 0711      sodium chloride flush 0.9 % injection 10 mL  10 mL Intravenous 2 times per day Hannah Rose MD        sodium chloride flush 0.9 % injection 10 mL  10 mL Intravenous PRN Hannah Rose MD        lidocaine PF 1 % injection 1 mL  1 mL Intradermal Once PRN Hannah Rose MD           Allergies:  No Known Allergies    Problem List:    Patient Active Problem List   Diagnosis Code    Chronic pain syndrome G89.4    Fibromyalgia M79.7    Mood disorder ([facility] [address] F39    Primary insomnia F51.01       Past Medical History:        Diagnosis Date    Anesthesia     admitted postop gallbladder for low heart rate    Arthritis     Asthma     Fibromyalgia     Generalized anxiety disorder     Insomnia     Major depressive disorder, recurrent episode, moderate ([facility] [address] 10/25/2010    Movement disorder     knee pain    PTSD (post-traumatic stress disorder)        Past Surgical History:        Procedure Laterality Date    CERVIX SURGERY      for dysplasia    CHOLECYSTECTOMY      LEEP         Social History:    Social History     Tobacco Use    Smoking status: Current Every Day Smoker     Packs/day: 1.00     Years: 8.00     Pack years: 8.00     Types: Cigarettes    Smokeless tobacco: Never Used    Tobacco comment: will talk to pcp when ready to quite    Substance Use Topics    Alcohol use:  No                                Ready to quit: Not Answered  Counseling given: Not Answered  Comment: will talk to pcp when ready to quite       Vital Signs (Current):   Vitals:    06/20/19 1323 06/21/19 0751   BP:  136/85   Pulse:  80   Resp:  16   Temp:  98.1 °F (36.7 °C)   TempSrc:  Oral   SpO2:  97%   Weight: 178 lb (80.7 kg) 178 lb (80.7 kg)   Height: 5' 6\" (1.676 m) 5' 6\" (1.676 m)                                              BP Readings from Last 3 Encounters:   06/21/19 136/85   06/11/19 128/88   05/29/19 128/76       NPO Status: Time of last liquid consumption: 0100(milk)                        Time of last solid consumption: 2300                        Date of last liquid consumption: 06/21/19                        Date of last solid food consumption: 06/20/19    BMI:   Wt Readings from Last 3 Encounters:   06/21/19 178 lb (80.7 kg)   06/11/19 178 lb (80.7 kg)   05/29/19 178 lb (80.7 kg)     Body mass index is 28.73 kg/m². CBC:   Lab Results   Component Value Date    WBC 18.5 05/14/2019    RBC 4.60 05/14/2019    HGB 13.8 05/14/2019    HCT 42.4 05/14/2019    MCV 92.3 05/14/2019    RDW 14.6 05/14/2019     05/14/2019       CMP:   Lab Results   Component Value Date     05/14/2019    K 4.6 05/14/2019     05/14/2019    CO2 19 05/14/2019    BUN 12 05/14/2019    CREATININE 0.6 05/14/2019    GFRAA >60 05/14/2019    GFRAA >60 03/03/2013    AGRATIO 1.4 05/14/2019    LABGLOM >60 05/14/2019    GLUCOSE 68 05/14/2019    GLUCOSE neg 07/13/2010    PROT 7.1 05/14/2019    PROT 7.0 03/03/2013    CALCIUM 9.4 05/14/2019    BILITOT <0.2 05/14/2019    ALKPHOS 51 05/14/2019    AST 11 05/14/2019    ALT 12 05/14/2019       POC Tests: No results for input(s): POCGLU, POCNA, POCK, POCCL, POCBUN, POCHEMO, POCHCT in the last 72 hours.     Coags: No results found for: PROTIME, INR, APTT    HCG (If Applicable):   Lab Results   Component Value Date    PREGTESTUR Negative 06/21/2019        ABGs: No results found for: PHART, PO2ART, KBQ5EIV, GYH1EXZ, BEART, C0RKSLPH Type & Screen (If Applicable):  No results found for: LABABO, [address]    Anesthesia Evaluation  Patient summary reviewed and Nursing notes reviewed  Airway: Mallampati: II  TM distance: >3 FB   Neck ROM: full  Mouth opening: > = 3 FB Dental: normal exam         Pulmonary:Negative Pulmonary ROS and normal exam  breath sounds clear to auscultation  (+) current smoker                           Cardiovascular:Negative CV ROS            Rhythm: regular  Rate: normal                    Neuro/Psych:   Negative Neuro/Psych ROS              GI/Hepatic/Renal: Neg GI/Hepatic/Renal ROS            Endo/Other: Negative Endo/Other ROS                    Abdominal:           Vascular: negative vascular ROS. Anesthesia Plan      general     ASA 2       Induction: intravenous. MIPS: Postoperative opioids intended and Prophylactic antiemetics administered. Anesthetic plan and risks discussed with patient.         Attending anesthesiologist reviewed and agrees with Tracy Saxena MD   6/21/2019

## 2019-06-21 NOTE — PROGRESS NOTES
Ambulatory Surgery/Procedure Discharge Note    Vitals:    06/21/19 1034   BP: (!) 150/104   Pulse: 76   Resp: 18   Temp: 97.6 °F (36.4 °C)   SpO2: 97%       In: 1150 [P.O.:150; I.V.:1000]  Out: -     Restroom use offered before discharge. Yes    Pain assessment:  level of pain (1-10, 10 severe), taking ice arguing with mother   Throat no active bleeding d/c inst reviewed with both  Pain Level: 8        Patient discharged to home/self care.  Patient discharged via wheel chair by transporter to waiting family/S.O.       6/21/2019 10:41 AM

## 2019-06-21 NOTE — H&P
Tor Marsh    [id number]    [facility] Same Day Surgery Update H & P  Department of General Surgery   Surgical Service   Pre-operative History and Physical  Last H & P within the last 30 days. DIAGNOSIS:   CHRONIC TONSILLITIS    PROCEDURE:  MA REMOVE TONSILS/ADENOIDS,14+ Y/O [01955] (TONSILLECTOMY ADENOIDECTOMY)     HISTORY OF PRESENT ILLNESS:    Patient with 8 month history of sore throat and odynophagia presents today for the above procedure.       Past Medical History:        Diagnosis Date    Anesthesia     admitted postop gallbladder for low heart rate    Arthritis     Asthma     Fibromyalgia     Generalized anxiety disorder     Insomnia     Major depressive disorder, recurrent episode, moderate ([facility] [address] 10/25/2010    Movement disorder     knee pain    PTSD (post-traumatic stress disorder)      Past Surgical History:        Procedure Laterality Date    CERVIX SURGERY      for dysplasia    CHOLECYSTECTOMY      LEEP       Past Social History:  Social History     Socioeconomic History    Marital status:      Spouse name: None    Number of children: None    Years of education: None    Highest education level: None   Occupational History    None   Social Needs    Financial resource strain: None    Food insecurity:     Worry: None     Inability: None    Transportation needs:     Medical: None     Non-medical: None   Tobacco Use    Smoking status: Current Every Day Smoker     Packs/day: 1.00     Years: 8.00     Pack years: 8.00     Types: Cigarettes    Smokeless tobacco: Never Used    Tobacco comment: will talk to pcp when ready to quite    Substance and Sexual Activity    Alcohol use: No    Drug use: Not Currently     Frequency: 10.0 times per week    Sexual activity: Yes     Partners: Male   Lifestyle    Physical activity:     Days per week: None     Minutes per session: None    Stress: None   Relationships    Social connections:     Talks on phone: None     Gets together: None     Attends Jain service: None     Active member of club or organization: None     Attends meetings of clubs or organizations: None     Relationship status: None    Intimate partner violence:     Fear of current or ex partner: None     Emotionally abused: None     Physically abused: None     Forced sexual activity: None   Other Topics Concern    None   Social History Narrative    None         Medications Prior to Admission:      Prior to Admission medications    Medication Sig Start Date End Date Taking?  Authorizing Provider   venlafaxine (EFFEXOR) 75 MG tablet Take 75 mg by mouth daily   Yes Historical Provider, MD   albuterol sulfate  (90 Base) MCG/ACT inhaler Inhale 2 puffs into the lungs 4 times daily as needed for Wheezing 1/30/19  Yes Avelino Newberry MD   cetirizine (ZYRTEC ALLERGY) 10 MG tablet Take 1 tablet by mouth daily 7/26/18  Yes Avelino Newberry MD   fluticasone (FLONASE) 50 MCG/ACT nasal spray 1 spray by Nasal route daily 3/15/18  Yes Avelino Newberry MD   norethindrone-ethinyl estradiol-iron (Pat Cantu FE1.5/30) 1.5-30 MG-MCG tablet Take by mouth daily   Yes Historical Provider, MD   TRI-LO-ANU 0.18/0.215/0.25 MG-25 MCG TABS TAKE ONE TABLET BY MOUTH DAILY AS DIRECTED 5/20/19   Avelino Newberry MD   predniSONE (DELTASONE) 20 MG tablet 2 tab po daily for 4 days then 1 tab po daily for 3 days 5/14/19   Avelino Newberry MD   predniSONE (DELTASONE) 20 MG tablet 2 tab po daily for 4 days then 1 tab po daily for 3 days 5/6/19   Avelino Newberry MD   predniSONE (DELTASONE) 20 MG tablet 2 tab po daily for 4 days then 1 tab po daily for 3 days 4/30/19   Avelino Newberry MD   cetirizine (ZYRTEC ALLERGY) 10 MG tablet Take 1 tablet by mouth daily 4/30/19   Avelino Newberry MD   cetirizine (ZYRTEC ALLERGY) 10 MG tablet Take 1 tablet by mouth daily 2/20/19   Avelino Newberry MD   cetirizine (ZYRTEC ALLERGY) 10 MG tablet Take 1 tablet by mouth daily 1/30/19 Nahed Mary MD   cetirizine (ZYRTEC ALLERGY) 10 MG tablet Take 1 tablet by mouth daily 11/12/18   Nahed Mary MD   cetirizine (ZYRTEC ALLERGY) 10 MG tablet Take 1 tablet by mouth daily 9/17/18   Nahed Mary MD   Norgestim-Eth Estrad Triphasic 0.18/0.215/0.25 MG-25 MCG TABS Daily as directed 9/17/18   Nahed Mary MD   medroxyPROGESTERone (PROVERA) 10 MG tablet Take 1 tablet by mouth daily 7/26/18   Nahed Mary MD         Allergies:  Patient has no known allergies. PHYSICAL EXAM:      /85   Pulse 80   Temp 98.1 °F (36.7 °C) (Oral)   Resp 16   Ht 5' 6\" (1.676 m)   Wt 178 lb (80.7 kg)   LMP 06/12/2019 (Exact Date)   SpO2 97%   BMI 28.73 kg/m²      Heart:  regular rate and rhythm    Lungs:  No increased work of breathing, good air exchange, clear to auscultation bilaterally, no crackles or wheezing    Abdomen:  soft, non-distended, non-tender, no rebound tenderness or guarding, normal active bowel sounds and no masses palpated    ASSESSMENT AND PLAN:    1. Patient seen and focused exam done today- no new changes since last physical exam on 6/11/19    2. Access to ancillary services are available per request of the provider.     WILLOW Cortes - CNP     6/21/2019

## 2019-06-21 NOTE — BRIEF OP NOTE
Brief Postoperative Note  ______________________________________________________________    Patient: Naomi Steele  YOB: 1985  MRN: [id number]  Date of Procedure: 6/21/2019    Pre-Op Diagnosis: CHRONIC TONSILLITIS    Post-Op Diagnosis: Same       Procedure(s):  TONSILLECTOMY WITH POSSIBLE ADENOIDECTOMY    Anesthesia: General    Surgeon(s):  Emerson Berry MD    Assistant: none    Estimated Blood Loss (mL): 10 cc     Complications: None    Specimens:   * No specimens in log *    Implants:  * No implants in log *      Drains: * No LDAs found *    Findings: erythematous tonsils with small pustules.     Melvi Casarez MD  Date: 6/21/2019  Time: 8:30 AM
electronic

## 2019-06-21 NOTE — ANESTHESIA POSTPROCEDURE EVALUATION
Department of Anesthesiology  Postprocedure Note    Patient: Francis Hoyos  MRN: [id number]  YOB: 1985  Date of evaluation: 6/21/2019  Time:  2:18 PM     Procedure Summary     Date:  06/21/19 Room / Location:  [facility] [address] / [facility] OR    Anesthesia Start:  0830 Anesthesia Stop:  4834    Procedure:  TONSILLECTOMY (N/A ) Diagnosis:  (CHRONIC TONSILLITIS)    Surgeon:  Justine Wilkinson MD Responsible Provider:  Joel Crews MD    Anesthesia Type:  general ASA Status:  2          Anesthesia Type: general    Shalini Phase I: Shalini Score: 10    Shalini Phase II: Shalini Score: 10    Last vitals: Reviewed and per EMR flowsheets. Anesthesia Post Evaluation    Patient location during evaluation: bedside  Patient participation: complete - patient participated  Level of consciousness: awake and alert  Pain score: 8  Airway patency: patent  Nausea & Vomiting: no nausea and no vomiting  Complications: no  Cardiovascular status: blood pressure returned to baseline  Respiratory status: acceptable  Hydration status: stable  Comments:  The patients analgetic needs are being addressed

## 2020-06-05 NOTE — PROGRESS NOTES
Letter sent to patient.     To Mode - please clarify which CTA is to be ordered.   Patient not yet notified.    feel rested in AM. Complains of feeling sleepy  during the day. She states that her moods have been depressed, tearful, labile with c/o loss of energy, having occasional crying spells. Denies being suicidal or homicidal.           ALLERGIES/PAST MED/FAM/SOC HISTORY: Ms. Angelica Cantu allergies, past medical, family and social history were reviewed in the chart and pertinent information also listed below. Social History     Social History    Marital status:      Spouse name: N/A    Number of children: N/A    Years of education: N/A     Occupational History    Not on file. Social History Main Topics    Smoking status: Current Every Day Smoker     Packs/day: 1.00     Years: 8.00     Types: Cigarettes    Smokeless tobacco: Never Used    Alcohol use No    Drug use: Yes     Frequency: 10.0 times per week      Comment: has used drugs wont elaborate on usage    Sexual activity: Yes     Partners: Male     Other Topics Concern    Not on file     Social History Narrative    No narrative on file      Ms. Scherer current medications are   Outpatient Medications Prior to Visit   Medication Sig Dispense Refill    cetirizine (ZYRTEC ALLERGY) 10 MG tablet Take 1 tablet by mouth daily 10 tablet 0    predniSONE (DELTASONE) 20 MG tablet 2 tab po daily for 4 days then 1 tab po daily for 3 days 11 tablet 0    albuterol sulfate HFA (VENTOLIN HFA) 108 (90 Base) MCG/ACT inhaler Inhale 2 puffs into the lungs every 6 hours as needed for Wheezing 1 Inhaler 3    OXcarbazepine (TRILEPTAL) 300 MG tablet Take daily 1 tab am 2 tabs pm 90 tablet 0    venlafaxine (EFFEXOR XR) 75 MG extended release capsule Take 1 tablet by mouth for a week then BID 60 capsule 1    nortriptyline (PAMELOR) 25 MG capsule Take 1 capsule by mouth nightly 30 capsule 0    buprenorphine (BUTRANS) 5 MCG/HR PTWK Place 1 patch onto the skin once a week .  4 patch 0    norethindrone-ethinyl estradiol-iron (MICROGESTIN FE1.5/30) 1.5-30 MG-MCG tablet Take by if medicines are misused and not taken as prescribed. If she develops new symptoms or if the symptoms worsen, she was told to call the office. Thank you for allowing me to participate in the care of this patient. Diana Portillo MD.    Cc: MD LISA Millard, Ana Maria Evangelista, scribing for in the presence  of Dr. Diana Portillo. 02/09/18  3:12 PM  Denis Osborn Assistant  I, Dr. Diana Portillo, personally performed the services described in this documentation as scribed by  Ana Maria Evangelista MA in my presence and it is both accurate and complete

## 2020-12-18 NOTE — PROGRESS NOTES
"History of Present Illness:   Price Godoy is a 29 y.o. male with a past psychiatric history of Schizoaffective disorder and Cannabis use disoder, currently presenting with <principal problem not specified>. Emergency Psychiatry was originally consulted to address the patient's symptoms of suicidal ideation, homicidal ideation and auditory hallucinations.     Per ED RN(s):  29 y.o. male to ED with c.o. suicidal and homicidal ideation x "a few days". Patient has no specific plan but states "if I had a gun I would shoot myself". Patient states he does not have access to guns. Patient reports he has been out of his psych medication for 2-3 months. Patient endorses auditory hallucinations that tell him to kill himself and to "obey". Patient laughing during conversation and states he is hearing voices currently. Patient denies all medical complaints at this time. Patient denies n/v/d, denies fever/chills, denies chest pain/ cough/ shortness of breath. Patient awake, alert, and oriented x 4. No apparent distress noted. VS currently stable. Patient assisted onto stretcher and given scrubs to wear. All items/ furniture removed from room. Bed placed in low locked position, side rails up x 2, sitter at bedside, orientation to room and explanation of wait provided to patient, awaiting MD evaluation and orders, will continue to monitor.        Per ED MD:  29-year-old male history of depression has been noncompliant his medications for over 3 months presents with increased auditory and visual hallucinations.  I have been going on for about a month.  He states they just tell an to obey.  He states that he is to obey God.  He states he is having intermittent thoughts of wanting to kill himself.  Denies any plan but states that if he had access to a gun he might shoot himself.  He however, does not have access to guns.  Denies any fevers or chills or any other complaints.        Per Psychiatry:  Upon initiation of interview, " C/o pain requested pain pill  Up wants to go dressing  voided "pt was found sitting on hospital bed. He was calm and cooperative throughout interview. Patient said he has been having racing thoughts. He said the content was to hurt people or hurt self. When asked what people, he said "kids." Patient said he has been hearing voices for the past 7 years, but they get better when he is taking medicine.  Specifically, the voices have been saying kid's names and telling him to run car off the road. Patient said he was taking Invega Trinza, but has not had injections in the past 2-3 months, because the nurse gave him injections in the arm and he wanted them in the hip. Patient endorsed not sleeping for the past couple of days. He also endorsed going to the Casino and clubs, and being more impulsive over the past few weeks. Patient said he was previously getting a shot at [facility] and last was hospitalized at [facility] one year ago. Patient also endorsed using THC, but denied other drugs or alcohol. Patient was linear throughout some points of the interview, but then would become tangential and interject hyperreligious statements like " Danyel Zaid  so we can live in abundance." Patient endorsed wanting to be back on medications. Finally he endorsed that he has never hurt himself before, but would have shot himself if he owned a gun.     Psychiatric Review of Systems:  sleep: yes, issues with sleep (slept for 5 hours last night, but prior to that had not slept in 3 days)  appetite: no, good  weight: no  energy/anergy: yes, low  interest/pleasure/anhedonia: no, likes to play pool  somatic symptoms: no  libido: no  anxiety/panic: yes  guilty/hopelessness: yes, sometimes  concentration: yes trouble with concentration  S.I.B.s/risky behavior: yes, has been going to casino and clubs  any drugs: yes, THC  alcohol: yes, a glass of wine every 2 weeks      Medical Review Of Systems:  Pertinent items noted in HPI     Psychiatric History:  Diagnose(s): Yes - Schizoaffective " "Disorder  Previous Medication Trials: Yes - Invega, Geodon, Prozac, Cogentin  Previous Psychiatric Hospitalizations: Yes - most recently [facility] one year ago  Family Psychiatric History: No  Outpatient Psychiatrist: No  Outpatient Therapist: No     Suicide/Violence Risk Assessment:  Current/active suicidal ideation/plan/intent: Yes - passive SI  Previous suicide attempts: No  Current/active homicidal ideation/plan/intent: Yes - Passive HI towards kids  History of threats/arrests associated with violent conduct - No  Access to firearms/lethal weapons - No     Social History:  Marital Status: single  Children: 0   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 Yes -   Legal consequences of chemical use: No     Legal History:  Past Charges/Incarcerations: Yes   Pending Charges: Yes     Psychosocial Factors:  Stressors: none.   Functioning Relationships: good support system     Collateral:   Yes - Father- Russell [phone]  Patient's father endorsed that patient lives in an apartment by himself, but works with him. He saw him today and patient told him the voices were bothering him. In addition, patient's dad saw patient actively responding to the voices today. He endorsed patient stopped getting Invega injections 3 months ago, because patient said he did not need it.  Patient also told his father that he has not been sleeping for the past couple of days. Patient's father feels that patient is going "downhill" without medication and would like him to get help.  "

## 2021-07-23 ENCOUNTER — HOSPITAL ENCOUNTER (EMERGENCY)
Age: 36
Discharge: HOME | End: 2021-07-23
Payer: MEDICAID

## 2021-07-23 VITALS
DIASTOLIC BLOOD PRESSURE: 104 MMHG | WEIGHT: 177.69 LBS | SYSTOLIC BLOOD PRESSURE: 145 MMHG | BODY MASS INDEX: 28.56 KG/M2 | OXYGEN SATURATION: 100 % | HEIGHT: 66 IN | RESPIRATION RATE: 20 BRPM | HEART RATE: 102 BPM | TEMPERATURE: 98.9 F

## 2021-07-23 DIAGNOSIS — R05.9 COUGH: ICD-10-CM

## 2021-07-23 DIAGNOSIS — R03.0 ELEVATED BLOOD PRESSURE READING: ICD-10-CM

## 2021-07-23 DIAGNOSIS — F11.93 OPIOID WITHDRAWAL (HCC): Primary | ICD-10-CM

## 2021-07-23 PROCEDURE — 6370000000 HC RX 637 (ALT 250 FOR IP): Performed by: PHYSICIAN ASSISTANT

## 2021-07-23 PROCEDURE — U0005 INFEC AGEN DETEC AMPLI PROBE: HCPCS

## 2021-07-23 PROCEDURE — U0003 INFECTIOUS AGENT DETECTION BY NUCLEIC ACID (DNA OR RNA); SEVERE ACUTE RESPIRATORY SYNDROME CORONAVIRUS 2 (SARS-COV-2) (CORONAVIRUS DISEASE [COVID-19]), AMPLIFIED PROBE TECHNIQUE, MAKING USE OF HIGH THROUGHPUT TECHNOLOGIES AS DESCRIBED BY CMS-2020-01-R: HCPCS

## 2021-07-23 PROCEDURE — 99284 EMERGENCY DEPT VISIT MOD MDM: CPT

## 2021-07-23 RX ORDER — DICYCLOMINE HYDROCHLORIDE 10 MG/1
10 CAPSULE ORAL 4 TIMES DAILY
Qty: 12 CAPSULE | Refills: 0 | Status: SHIPPED | OUTPATIENT
Start: 2021-07-23 | End: 2021-08-27 | Stop reason: SDUPTHER

## 2021-07-23 RX ORDER — DICYCLOMINE HYDROCHLORIDE 10 MG/1
20 CAPSULE ORAL ONCE
Status: COMPLETED | OUTPATIENT
Start: 2021-07-23 | End: 2021-07-23

## 2021-07-23 RX ORDER — ONDANSETRON 4 MG/1
4 TABLET, ORALLY DISINTEGRATING ORAL EVERY 8 HOURS PRN
Qty: 20 TABLET | Refills: 0 | Status: SHIPPED | OUTPATIENT
Start: 2021-07-23 | End: 2021-08-27 | Stop reason: SDUPTHER

## 2021-07-23 RX ORDER — LOPERAMIDE HYDROCHLORIDE 2 MG/1
2 CAPSULE ORAL 4 TIMES DAILY PRN
Qty: 20 CAPSULE | Refills: 0 | Status: SHIPPED | OUTPATIENT
Start: 2021-07-23 | End: 2022-11-03 | Stop reason: SDUPTHER

## 2021-07-23 RX ORDER — LORAZEPAM 1 MG/1
1 TABLET ORAL ONCE
Status: COMPLETED | OUTPATIENT
Start: 2021-07-23 | End: 2021-07-23

## 2021-07-23 RX ORDER — OXAZEPAM 15 MG/1
15 CAPSULE ORAL EVERY 8 HOURS PRN
Qty: 9 CAPSULE | Refills: 0 | Status: SHIPPED | OUTPATIENT
Start: 2021-07-23 | End: 2021-07-26

## 2021-07-23 RX ORDER — CLONIDINE HYDROCHLORIDE 0.1 MG/1
0.1 TABLET ORAL ONCE
Status: COMPLETED | OUTPATIENT
Start: 2021-07-23 | End: 2021-07-23

## 2021-07-23 RX ADMIN — DICYCLOMINE HYDROCHLORIDE 20 MG: 10 CAPSULE ORAL at 19:24

## 2021-07-23 RX ADMIN — LORAZEPAM 1 MG: 1 TABLET ORAL at 19:24

## 2021-07-23 RX ADMIN — CLONIDINE HYDROCHLORIDE 0.1 MG: 0.1 TABLET ORAL at 19:24

## 2021-07-23 ASSESSMENT — ENCOUNTER SYMPTOMS
RHINORRHEA: 1
VOMITING: 0
COUGH: 1
NAUSEA: 1
ABDOMINAL PAIN: 0
SORE THROAT: 0
BACK PAIN: 0
SHORTNESS OF BREATH: 0
DIARRHEA: 1

## 2021-07-23 NOTE — ED PROVIDER NOTES
[facility] ENCOUNTER      Pt Name: Luna Alegre  MRN: [id number]  [place] 1985  Date of evaluation: 7/23/2021  Provider: Tima Biggs PA-C    This patient was not seen and evaluated by the attending physician No att. providers found. CHIEF COMPLAINT      Chief Complaint: Opioid Withdrawal      HISTORYOF PRESENT ILLNESS  (Location/Symptom, Timing/Onset, Context/Setting, Quality, Duration, Modifying Factors, Severity.)   Luna Alegre is a 37 y.o. female who presents to the emergency department with symptoms of opioid withdrawal.  She last used fentanyl 3 days ago. She also uses marijuana and cocaine. She says she has muscle aching, diarrhea, nausea, anxiety and restless leg. She called  an inpatient rehabilitation center today and they are picking her up on Monday for inpatient treatment. She would like something prescribed to help her with her symptoms of withdrawal.  She also notes that she has had cough and runny nose for the past 3 weeks. She did see her primary care physician for this and finished amoxicillin. She denies fevers, chest pain, shortness of breath. Nursing Notes were reviewed and I agree. REVIEW OF SYSTEMS    (2-9 systems for level 4, 10 or more forlevel 5)     Review of Systems   Constitutional: Negative for chills and fever. HENT: Positive for rhinorrhea. Negative for sore throat. Respiratory: Positive for cough. Negative for shortness of breath. Cardiovascular: Negative for chest pain. Gastrointestinal: Positive for diarrhea and nausea. Negative for abdominal pain and vomiting. Genitourinary: Negative for difficulty urinating and dysuria. Musculoskeletal: Positive for myalgias. Negative for back pain. Skin: Negative for rash. Neurological: Negative for light-headedness and headaches. Psychiatric/Behavioral: The patient is nervous/anxious.     All other systems reviewed and are negative. Positives and Pertinent negatives as per HPI. Except as noted above the remainder of the review of systems was reviewed and negative. PAST MEDICALHISTORY         Diagnosis Date    Anesthesia     admitted postop gallbladder for low heart rate    Arthritis     Asthma     Fibromyalgia     Generalized anxiety disorder     Hypertension     Insomnia     Major depressive disorder, recurrent episode, moderate ([facility] [address] 10/25/2010    Movement disorder     knee pain    Multiple substance abuse ([facility] [address]     PTSD (post-traumatic stress disorder)        SURGICAL HISTORY           Procedure Laterality Date    CERVIX SURGERY      for dysplasia    CHOLECYSTECTOMY      LEEP      TONSILLECTOMY N/A 6/21/2019    TONSILLECTOMY performed by Steve Mcginnis MD at [address] [place] [address]       Previous Medications    CETIRIZINE (ZYRTEC ALLERGY) 10 MG TABLET    Take 1 tablet by mouth daily    CLONIDINE (CATAPRES) 0.2 MG TABLET    Take 1 tablet by mouth 3 times daily    HYDROXYZINE (ATARAX) 25 MG TABLET    TAKE ONE TABLET BY MOUTH EVERY 8 HOURS AS NEEDED FOR ITCHING    IBUPROFEN (ADVIL;MOTRIN) 800 MG TABLET    Take 1 tablet by mouth every 8 hours as needed for Pain    LAMOTRIGINE (LAMICTAL) 25 MG TABLET    TAKE ONE TABLET BY MOUTH TWICE A DAY    NORGESTIM-ETH ESTRAD TRIPHASIC (TRI-SPRINTEC) 0.18/0.215/0.25 MG-35 MCG TABS    TAKE ONE TABLET BY MOUTH DAILY    ROPINIROLE (REQUIP) 0.5 MG TABLET    Take 1 tablet by mouth 3 times daily    TIZANIDINE (ZANAFLEX) 4 MG TABLET    Take 1 tablet by mouth nightly as needed (pain)    TRAZODONE (DESYREL) 100 MG TABLET    TAKE TWO TABLETS BY MOUTH ONCE NIGHTLY AS NEEDED FOR SLEEP    VENLAFAXINE HCL PO    Take 1 tablet by mouth daily ?dose   For fibromyalgia       ALLERGIES     Patient has no known allergies.     FAMILY HISTORY           Problem Relation Age of Onset    Substance Abuse Father      Family Status   Relation Name Status    Mother  Alive    Father Alive        SOCIAL HISTORY    reports that she has been smoking cigarettes. She has a 8.00 pack-year smoking history. She has never used smokeless tobacco. She reports current alcohol use. She reports previous drug use. Frequency: 10.00 times per week. Drugs: Marijuana, Opiates , and Cocaine. PHYSICAL EXAM    (up to 7 for level 4, 8 or more for level 5)     ED Triage Vitals [07/23/21 1850]   BP Temp Temp Source Pulse Resp SpO2 Height Weight   (!) 145/104 98.9 °F (37.2 °C) Oral 102 20 100 % 5' 6\" (1.676 m) 177 lb 11.1 oz (80.6 kg)       Physical Exam  Vitals and nursing note reviewed. Constitutional:       General: She is in acute distress (crying). Appearance: She is well-developed. She is not ill-appearing. HENT:      Head: Normocephalic and atraumatic. Cardiovascular:      Rate and Rhythm: Normal rate and regular rhythm. Heart sounds: Normal heart sounds. Pulmonary:      Effort: Pulmonary effort is normal. No respiratory distress. Breath sounds: Normal breath sounds. Musculoskeletal:         General: Normal range of motion. Cervical back: Neck supple. Skin:     General: Skin is warm and dry. Neurological:      Mental Status: She is alert and oriented to person, place, and time. Psychiatric:         Behavior: Behavior normal.      Comments: Anxious, tearful              EMERGENCY DEPARTMENT COURSE and DIFFERENTIAL DIAGNOSIS/MDM:   Vitals:    Vitals:    07/23/21 1850   BP: (!) 145/104   Pulse: 102   Resp: 20   Temp: 98.9 °F (37.2 °C)   TempSrc: Oral   SpO2: 100%   Weight: 177 lb 11.1 oz (80.6 kg)   Height: 5' 6\" (1.676 m)        I have evaluated this patient. My supervising physician was available for consultation. Patient is tearful, anxious. She has mild tachycardia which I feel is related to the fact that she is crying. She is also hypertensive which is likely related to acute anxiety and crying. She does take clonidine at home.   I gave her one dose here as well as Bentyl and Ativan to help with her acute symptoms. Her lungs are clear. I suspect viral illness versus allergies as the cause of cough and rhinorrhea. Patient would like to be screened for Covid which will be performed prior to discharge. I think most importantly she needs medications to help her through the weekend until Monday when she can be taken to inpatient rehabilitation. She was prescribed Zofran, Bentyl, Serax and Imodium. Discussed results, diagnosis and plan with patient and/or family. Questions addressed. Dispositionand follow-up agreed upon. Specific discharge instructions explained. The patient and/or family and I have discussed the diagnosis and risks, and we agree with discharging home to follow-up with their primary care,specialist or referral doctor. We also discussed returning to the Emergency Department immediately if new or worsening symptoms occur. We have discussed the symptoms which are most concerning that necessitate immediatereturn. PROCEDURES:  None    FINAL IMPRESSION      1. Opioid withdrawal (HCC)    2. Elevated blood pressure reading    3. Cough          DISPOSITION/PLAN   DISPOSITION Decision To Discharge 07/23/2021 07:07:34 PM      PATIENT REFERRED TO:  Katja Ren MD  [facility] [address]  [phone]    Schedule an appointment as soon as possible for a visit       [place] [address]  [phone]    If symptoms worsen      MEDICATIONS:  New Prescriptions    DICYCLOMINE (BENTYL) 10 MG CAPSULE    Take 1 capsule by mouth 4 times daily for 12 doses    LOPERAMIDE (RA ANTI-DIARRHEAL) 2 MG CAPSULE    Take 1 capsule by mouth 4 times daily as needed for Diarrhea    ONDANSETRON (ZOFRAN ODT) 4 MG DISINTEGRATING TABLET    Take 1 tablet by mouth every 8 hours as needed for Nausea Let dissolve in mouth.     OXAZEPAM (SERAX) 15 MG CAPSULE    Take 1 capsule by mouth every 8 hours as needed for Anxiety for up to 3 days.        (Please note that portions of this note were completed with a voice recognition program.  Efforts were made toedit the dictations but occasionally words are mis-transcribed.)    THANG Nayak PA-C  07/23/21 1927

## 2021-07-24 LAB — SARS-COV-2, PCR: NOT DETECTED

## 2021-07-24 NOTE — ED NOTES
Mom here to drive pt home. Discharge instructions with pt. Explained rx's. Encouraged pt to call the referrals given to her (have encouraged pt to call referrals as soon as I gave them to her--pt hasn't called them). Pt states she will call when she gets home. No pain.         Gasper Zamarripa RN  07/23/21 5681

## 2021-07-24 NOTE — ED NOTES
THANG already examined pt. This RN gave pt lots of resources for addiction treatment. Wendy Huff, addiction navigator's business card given. Referral sheet for [facility] outpt services given. Referral sheet with 24 hr [facility], [facility], and  addiction services phone number given. Also gave pt referral form for homeless shelters. Pt had said she was being evicted soon. Her dtr is staying with her mom but she didn't think there was room for all of them. No pain.      Laura López, UCHE  07/23/21 0097

## 2021-07-24 NOTE — ED NOTES
meds ordered explained and being given. Pt calling her mom for a ride home.      Chaim Gilliland RN  07/23/21 9926

## 2021-08-27 ENCOUNTER — HOSPITAL ENCOUNTER (EMERGENCY)
Age: 36
Discharge: HOME | End: 2021-08-27
Attending: EMERGENCY MEDICINE
Payer: MEDICAID

## 2021-08-27 VITALS
RESPIRATION RATE: 18 BRPM | OXYGEN SATURATION: 100 % | WEIGHT: 182.54 LBS | DIASTOLIC BLOOD PRESSURE: 93 MMHG | BODY MASS INDEX: 29.34 KG/M2 | HEART RATE: 79 BPM | SYSTOLIC BLOOD PRESSURE: 136 MMHG | TEMPERATURE: 98.1 F | HEIGHT: 66 IN

## 2021-08-27 DIAGNOSIS — F11.93 OPIATE WITHDRAWAL (HCC): ICD-10-CM

## 2021-08-27 DIAGNOSIS — F11.90 OPIOID USE DISORDER: Primary | ICD-10-CM

## 2021-08-27 PROCEDURE — 99284 EMERGENCY DEPT VISIT MOD MDM: CPT

## 2021-08-27 PROCEDURE — 6370000000 HC RX 637 (ALT 250 FOR IP): Performed by: EMERGENCY MEDICINE

## 2021-08-27 RX ORDER — CLONIDINE HYDROCHLORIDE 0.1 MG/1
0.1 TABLET ORAL ONCE
Status: COMPLETED | OUTPATIENT
Start: 2021-08-27 | End: 2021-08-27

## 2021-08-27 RX ORDER — LORAZEPAM 0.5 MG/1
0.5 TABLET ORAL 3 TIMES DAILY PRN
Qty: 12 TABLET | Refills: 0 | Status: SHIPPED | OUTPATIENT
Start: 2021-08-27 | End: 2021-08-30

## 2021-08-27 RX ORDER — ONDANSETRON 4 MG/1
4 TABLET, ORALLY DISINTEGRATING ORAL EVERY 8 HOURS PRN
Qty: 20 TABLET | Refills: 0 | Status: SHIPPED | OUTPATIENT
Start: 2021-08-27 | End: 2021-09-28

## 2021-08-27 RX ORDER — ONDANSETRON 4 MG/1
4 TABLET, FILM COATED ORAL ONCE
Status: COMPLETED | OUTPATIENT
Start: 2021-08-27 | End: 2021-08-27

## 2021-08-27 RX ORDER — DICYCLOMINE HYDROCHLORIDE 10 MG/1
10 CAPSULE ORAL ONCE
Status: COMPLETED | OUTPATIENT
Start: 2021-08-27 | End: 2021-08-27

## 2021-08-27 RX ORDER — DICYCLOMINE HYDROCHLORIDE 10 MG/1
10 CAPSULE ORAL 4 TIMES DAILY
Qty: 12 CAPSULE | Refills: 0 | Status: SHIPPED | OUTPATIENT
Start: 2021-08-27 | End: 2021-09-28

## 2021-08-27 RX ORDER — LORAZEPAM 1 MG/1
1 TABLET ORAL ONCE
Status: COMPLETED | OUTPATIENT
Start: 2021-08-27 | End: 2021-08-27

## 2021-08-27 RX ADMIN — CLONIDINE HYDROCHLORIDE 0.1 MG: 0.1 TABLET ORAL at 18:02

## 2021-08-27 RX ADMIN — ONDANSETRON HYDROCHLORIDE 4 MG: 4 TABLET, FILM COATED ORAL at 18:02

## 2021-08-27 RX ADMIN — DICYCLOMINE HYDROCHLORIDE 10 MG: 10 CAPSULE ORAL at 18:02

## 2021-08-27 RX ADMIN — LORAZEPAM 1 MG: 1 TABLET ORAL at 18:02

## 2021-08-27 ASSESSMENT — PAIN DESCRIPTION - LOCATION: LOCATION: GENERALIZED

## 2021-08-27 ASSESSMENT — PAIN DESCRIPTION - DESCRIPTORS: DESCRIPTORS: ACHING

## 2021-08-27 ASSESSMENT — PAIN DESCRIPTION - PAIN TYPE: TYPE: ACUTE PAIN

## 2021-08-27 ASSESSMENT — PAIN SCALES - GENERAL: PAINLEVEL_OUTOF10: 10

## 2021-08-27 NOTE — ED NOTES
Patient ambulatory from ED. AVS provided and discussed with patient. All questions answered. Patient verbalizes understanding of discharge instructions. Respirations even and easy. No obvious distress at this time. Patient notified that they should not drive after receiving while taking ativan due to potential for drowsiness and its status as a controlled substance. Patient verbalizes understanding.        Venkata Villa RN  08/27/21 1416

## 2021-08-27 NOTE — ED PROVIDER NOTES
CHIEF COMPLAINT  Withdrawal (Patient reports attempting to detoxify from fentanyl use. Reports daily fentanyl use4 for one month and states she has not used x2 days. Patient has attempted to detox before and has struggled with relapses.)      HISTORY OF PRESENT ILLNESS  Danielle Morelos is a 37 y.o. female who presents to the ED complaining of withdrawal from fentanyl use. The patient states she is about 3 days out from her last fentanyl use is trying to withdrawal on her own, does not want to go to rehab, and does not want to use Suboxone. She is used for the last 1-1/2 years, has had friends that have had difficulty with addiction related to Suboxone as well and does not want any part of that she is think she is gone this far and just needs a little help getting to clean having used Ativan to help with the withdrawal symptoms. Specifically she has had nausea, abdominal cramping and sweats. She has had no fever and no cough. She is not pregnant. No other complaints, modifying factors or associated symptoms. Nursing notes reviewed.    Past Medical History:   Diagnosis Date    Anesthesia     admitted postop gallbladder for low heart rate    Arthritis     Asthma     Fibromyalgia     Generalized anxiety disorder     Hypertension     Insomnia     Major depressive disorder, recurrent episode, moderate ([facility] [address] 10/25/2010    Movement disorder     knee pain    Multiple substance abuse ([facility] [address]     PTSD (post-traumatic stress disorder)      Past Surgical History:   Procedure Laterality Date    CERVIX SURGERY      for dysplasia    CHOLECYSTECTOMY      LEEP      TONSILLECTOMY N/A 6/21/2019    TONSILLECTOMY performed by Madyson Mcdonough MD at [facility] OR     Family History   Problem Relation Age of Onset    Substance Abuse Father      Social History     Socioeconomic History    Marital status:      Spouse name: Not on file    Number of children: Not on file    Years of education: Not on file    Highest education level: Not on file   Occupational History    Not on file   Tobacco Use    Smoking status: Current Every Day Smoker     Packs/day: 1.00     Years: 8.00     Pack years: 8.00     Types: Cigarettes    Smokeless tobacco: Never Used    Tobacco comment: will talk to pcp when ready to quite    Substance and Sexual Activity    Alcohol use: Yes     Comment: once a year    Drug use: Not Currently     Frequency: 10.0 times per week     Types: Marijuana, Opiates , Cocaine     Comment: fentanyl, cocaine, marijuana    Sexual activity: Yes     Partners: Male   Other Topics Concern    Not on file   Social History Narrative    Not on file     Social Determinants of Health     Financial Resource Strain:     Difficulty of Paying Living Expenses:    Food Insecurity:     Worried About Running Out of Food in the Last Year:     Ran Out of Food in the Last Year:    Transportation Needs:     Lack of Transportation (Medical):  Lack of Transportation (Non-Medical):    Physical Activity:     Days of Exercise per Week:     Minutes of Exercise per Session:    Stress:     Feeling of Stress :    Social Connections:     Frequency of Communication with Friends and Family:     Frequency of Social Gatherings with Friends and Family:     Attends [employer] Services:     Active Member of Clubs or Organizations:     Attends Club or Organization Meetings:     Marital Status:    Intimate Partner Violence:     Fear of Current or Ex-Partner:     Emotionally Abused:     Physically Abused:     Sexually Abused:      No current facility-administered medications for this encounter. Current Outpatient Medications   Medication Sig Dispense Refill    ondansetron (ZOFRAN ODT) 4 MG disintegrating tablet Take 1 tablet by mouth every 8 hours as needed for Nausea Let dissolve in mouth.  20 tablet 0    lamoTRIgine (LAMICTAL) 25 MG tablet TAKE ONE TABLET BY MOUTH TWICE A DAY 60 tablet 0    VENLAFAXINE HCL PO Take 1 tablet by mouth daily ?dose   For fibromyalgia      dicyclomine (BENTYL) 10 MG capsule Take 1 capsule by mouth 4 times daily for 12 doses 12 capsule 0    hydrOXYzine (ATARAX) 25 MG tablet TAKE ONE TABLET BY MOUTH EVERY 8 HOURS AS NEEDED FOR ITCHING 30 tablet 0    rOPINIRole (REQUIP) 0.5 MG tablet Take 1 tablet by mouth 3 times daily 60 tablet 3    traZODone (DESYREL) 100 MG tablet TAKE TWO TABLETS BY MOUTH ONCE NIGHTLY AS NEEDED FOR SLEEP 60 tablet 2    tiZANidine (ZANAFLEX) 4 MG tablet Take 1 tablet by mouth nightly as needed (pain) 30 tablet 2    ibuprofen (ADVIL;MOTRIN) 800 MG tablet Take 1 tablet by mouth every 8 hours as needed for Pain 90 tablet 3    cloNIDine (CATAPRES) 0.2 MG tablet Take 1 tablet by mouth 3 times daily 60 tablet 3    cetirizine (ZYRTEC ALLERGY) 10 MG tablet Take 1 tablet by mouth daily 30 tablet 0    Norgestim-Eth Estrad Triphasic (TRI-SPRINTEC) 0.18/0.215/0.25 MG-35 MCG TABS TAKE ONE TABLET BY MOUTH DAILY 28 tablet 5     No Known Allergies    REVIEW OF SYSTEMS  6 systems reviewed, pertinent positives per HPI otherwise noted to be negative    PHYSICAL EXAM  BP (!) 141/90   Pulse 88   Temp 98.1 °F (36.7 °C) (Oral)   Resp 16   Ht 5' 6\" (1.676 m)   Wt 182 lb 8.7 oz (82.8 kg)   SpO2 97%   BMI 29.46 kg/m²   GENERAL APPEARANCE: Awake and alert. Cooperative. Minimal distress  HEAD: Normocephalic. Atraumatic. EYES: PERRL. EOM's grossly intact. ENT: Mucous membranes are moist.  No erythema or exudate  NECK: Supple. Normal ROM. No JVD  CHEST: Equal symmetric chest rise. Heart regular rate and rhythm with no murmurs. LUNGS: Breathing is unlabored. Speaking comfortably in full sentences. Abdomen: Nondistended. Soft and nontender. EXTREMITIES: MAEE. No acute deformities. SKIN: Warm and dry. Healed track marks from prior use. NEUROLOGICAL: Alert and oriented. Strength is 5/5 in all extremities and sensation is intact.   Psychological: Normal mood and affect, no SI or HI or

## 2021-08-27 NOTE — DISCHARGE INSTR - COC
Continuity of Care Form    Patient Name: Ashley Sauer   :  1985  MRN:  [id number]    Admit date:  2021  Discharge date:  ***    Code Status Order: Prior   Advance Directives:     Admitting Physician:  No admitting provider for patient encounter.   PCP: Tessy Oglesby MD    Discharging Nurse: [facility] Care Unit/Room#: QC   Discharging Unit Phone Number: ***    Emergency Contact:   Extended Emergency Contact Information  Primary Emergency Contact: Ilene Scherer  Address:  [place] [address]           [facility] [address] Phone: [phone]  Work Phone: [phone]  Relation: Parent  Secondary Emergency Contact: [address] Phone: [phone]  Relation: Brother/Sister    Past Surgical History:  Past Surgical History:   Procedure Laterality Date    CERVIX SURGERY      for dysplasia    CHOLECYSTECTOMY      LEEP      TONSILLECTOMY N/A 2019    TONSILLECTOMY performed by Nichole Jones MD at [address] OR       Immunization History:   Immunization History   Administered Date(s) Administered    Influenza Virus Vaccine 10/11/2012    Influenza Whole 2010    Influenza, Quadv, IM, (6 mo and older Fluzone, Flulaval, Fluarix and 3 yrs and older Afluria) 10/11/2012       Active Problems:  Patient Active Problem List   Diagnosis Code    Chronic pain syndrome G89.4    Fibromyalgia M79.7    Mood disorder ([facility] [address] F39    Primary insomnia F51.01       Isolation/Infection:   Isolation          No Isolation        Patient Infection Status     Infection Onset Added Last Indicated Last Indicated By Review Planned Expiration Resolved Resolved By    None active    Resolved    COVID-19 Rule Out 21 COVID-19 (Ordered)   21 Rule-Out Test Resulted          Nurse Assessment:  Last Vital Signs: BP (!) 151/89   Pulse 88   Temp 98.1 °F (36.7 °C) (Oral)   Resp 16   Ht 5' 6\" (1.676 m)   Wt 182 lb 8.7 oz (82.8 kg)   SpO2 97%   BMI 29.46 kg/m²     Last documented pain score (0-10 scale): Pain Level: 10  Last Weight:   Wt Readings from Last 1 Encounters:   21 182 lb 8.7 oz (82.8 kg)     Mental Status:  {IP PT MENTAL STATUS:61161}    IV Access:  { KHADAR IV ACCESS:617492442}    Nursing Mobility/ADLs:  Walking   {CHP DME JMIF:832528926}  Transfer  {CHP DME JFMH:892177273}  Bathing  {CHP DME IWCR:244648668}  Dressing  {CHP DME GTCV:044441720}  Toileting  {CHP DME IFNH:632370390}  Feeding  {CHP DME QZV}  Med Admin  {CHP DME URRO:175507576}  Med Delivery   { KHADAR MED Delivery:037802669}    Wound Care Documentation and Therapy:        Elimination:  Continence:   · Bowel: {YES / IH:49760}  · Bladder: {YES / ZB:38130}  Urinary Catheter: {Urinary Catheter:331928194}   Colostomy/Ileostomy/Ileal Conduit: {YES / KN:16979}       Date of Last BM: ***  No intake or output data in the 24 hours ending 21 1747  No intake/output data recorded.     Safety Concerns:     [facility] Safety Concerns:868509611}    Impairments/Disabilities:      [facility] Impairments/Disabilities:332102810}    Nutrition Therapy:  Current Nutrition Therapy:   [facility] Diet List:693302267}    Routes of Feeding: {P DME Other Feedings:039869163}  Liquids: {Slp liquid thickness:38994}  Daily Fluid Restriction: {CHP DME Yes amt example:747510720}  Last Modified Barium Swallow with Video (Video Swallowing Test): {Done Not Done QOJF:217755768}    Treatments at the Time of Hospital Discharge:   Respiratory Treatments: ***  Oxygen Therapy:  {Therapy; copd oxygen:58748}  Ventilator:    { CC Vent YWUQ:382803200}    Rehab Therapies: {THERAPEUTIC INTERVENTION:8600870285}  Weight Bearing Status/Restrictions: [employer]  Weight Bearin}  Other Medical Equipment (for information only, NOT a DME order):  {EQUIPMENT:144498564}  Other Treatments: ***    Patient's personal belongings (please select all that are sent with patient):  {[facility] DME Belongings:151501897}    RN SIGNATURE:  {Esignature:065555628}    CASE MANAGEMENT/SOCIAL WORK SECTION    Inpatient Status Date: ***    Readmission Risk Assessment Score:  Readmission Risk              Risk of Unplanned Readmission:  0           Discharging to Facility/ Agency   · Name:   · Address:  · Phone:  · Fax:    Dialysis Facility (if applicable)   · Name:  · Address:  · Dialysis Schedule:  · Phone:  · Fax:    / signature: {Esignature:697512724}    PHYSICIAN SECTION    Prognosis: {Prognosis:9559125544}    Condition at Discharge: [address] [place] [address] Patient Condition:820587763}    Rehab Potential (if transferring to Rehab): {Prognosis:1474847241}    Recommended Labs or Other Treatments After Discharge: ***    Physician Certification: I certify the above information and transfer of Deepika Swain  is necessary for the continuing treatment of the diagnosis listed and that she requires {Admit to Appropriate Level of Care:19517} for {GREATER/LESS:281541505} 30 days.      Update Admission H&P: {CHP DME Changes in LCDAY:531880702}    PHYSICIAN SIGNATURE:  {Esignature:006872400}

## 2023-01-01 ENCOUNTER — APPOINTMENT (OUTPATIENT)
Dept: GENERAL RADIOLOGY | Age: 38
DRG: 682 | End: 2023-01-01
Payer: MEDICARE

## 2023-01-01 ENCOUNTER — APPOINTMENT (OUTPATIENT)
Dept: CT IMAGING | Age: 38
DRG: 682 | End: 2023-01-01
Payer: MEDICARE

## 2023-01-01 ENCOUNTER — HOSPITAL ENCOUNTER (INPATIENT)
Age: 38
LOS: 2 days | DRG: 682 | End: 2023-06-03
Attending: EMERGENCY MEDICINE | Admitting: STUDENT IN AN ORGANIZED HEALTH CARE EDUCATION/TRAINING PROGRAM
Payer: MEDICARE

## 2023-01-01 VITALS
SYSTOLIC BLOOD PRESSURE: 76 MMHG | OXYGEN SATURATION: 92 % | BODY MASS INDEX: 40.46 KG/M2 | DIASTOLIC BLOOD PRESSURE: 47 MMHG | WEIGHT: 250.66 LBS | TEMPERATURE: 83.5 F

## 2023-01-01 DIAGNOSIS — E87.29 HIGH ANION GAP METABOLIC ACIDOSIS: ICD-10-CM

## 2023-01-01 DIAGNOSIS — E87.5 SERUM POTASSIUM ELEVATED: ICD-10-CM

## 2023-01-01 DIAGNOSIS — J69.0 ASPIRATION PNEUMONIA, UNSPECIFIED ASPIRATION PNEUMONIA TYPE, UNSPECIFIED LATERALITY, UNSPECIFIED PART OF LUNG (HCC): ICD-10-CM

## 2023-01-01 DIAGNOSIS — K72.01 ACUTE LIVER FAILURE WITH HEPATIC COMA (HCC): ICD-10-CM

## 2023-01-01 DIAGNOSIS — R74.8 ELEVATED ALKALINE PHOSPHATASE LEVEL: ICD-10-CM

## 2023-01-01 DIAGNOSIS — R74.8 ELEVATED CK: ICD-10-CM

## 2023-01-01 DIAGNOSIS — R79.9 ELEVATED BUN: ICD-10-CM

## 2023-01-01 DIAGNOSIS — T79.6XXA TRAUMATIC RHABDOMYOLYSIS, INITIAL ENCOUNTER (HCC): ICD-10-CM

## 2023-01-01 DIAGNOSIS — T50.904A OVERDOSE OF UNDETERMINED INTENT, INITIAL ENCOUNTER: ICD-10-CM

## 2023-01-01 DIAGNOSIS — R74.01 ELEVATED ALT MEASUREMENT: ICD-10-CM

## 2023-01-01 DIAGNOSIS — D72.829 LEUKOCYTOSIS, UNSPECIFIED TYPE: ICD-10-CM

## 2023-01-01 DIAGNOSIS — R79.89 ELEVATED LACTIC ACID LEVEL: ICD-10-CM

## 2023-01-01 DIAGNOSIS — R09.2 RESPIRATORY ARREST (HCC): Primary | ICD-10-CM

## 2023-01-01 DIAGNOSIS — R74.01 ELEVATED AST (SGOT): ICD-10-CM

## 2023-01-01 DIAGNOSIS — N17.9 ACUTE RENAL FAILURE, UNSPECIFIED ACUTE RENAL FAILURE TYPE (HCC): ICD-10-CM

## 2023-01-01 LAB
ABO + RH BLD: NORMAL
ALBUMIN SERPL-MCNC: 1.3 G/DL (ref 3.4–5)
ALBUMIN SERPL-MCNC: 2.7 G/DL (ref 3.4–5)
ALBUMIN SERPL-MCNC: 2.9 G/DL (ref 3.4–5)
ALBUMIN SERPL-MCNC: 3.7 G/DL (ref 3.4–5)
ALBUMIN/GLOB SERPL: 1.4 {RATIO} (ref 1.1–2.2)
ALP SERPL-CCNC: 182 U/L (ref 40–129)
ALP SERPL-CCNC: 215 U/L (ref 40–129)
ALP SERPL-CCNC: 332 U/L (ref 40–129)
ALT SERPL-CCNC: 1707 U/L (ref 10–40)
ALT SERPL-CCNC: 4007 U/L (ref 10–40)
ALT SERPL-CCNC: >7000 U/L (ref 10–40)
ANION GAP SERPL CALCULATED.3IONS-SCNC: 16 MMOL/L (ref 3–16)
ANION GAP SERPL CALCULATED.3IONS-SCNC: 17 MMOL/L (ref 3–16)
ANION GAP SERPL CALCULATED.3IONS-SCNC: 22 MMOL/L (ref 3–16)
ANION GAP SERPL CALCULATED.3IONS-SCNC: 28 MMOL/L (ref 3–16)
ANION GAP SERPL CALCULATED.3IONS-SCNC: 30 MMOL/L (ref 3–16)
ANION GAP SERPL CALCULATED.3IONS-SCNC: 32 MMOL/L (ref 3–16)
ANION GAP SERPL CALCULATED.3IONS-SCNC: 33 MMOL/L (ref 3–16)
ANION GAP SERPL CALCULATED.3IONS-SCNC: 33 MMOL/L (ref 3–16)
ANISOCYTOSIS BLD QL SMEAR: ABNORMAL
APAP SERPL-MCNC: <5 UG/ML (ref 10–30)
APTT BLD: >180 SEC (ref 22.7–35.9)
AST SERPL-CCNC: 2134 U/L (ref 15–37)
AST SERPL-CCNC: 6389 U/L (ref 15–37)
AST SERPL-CCNC: >7000 U/L (ref 15–37)
BACTERIA BLD CULT ORG #2: NORMAL
BACTERIA BLD CULT: NORMAL
BASE EXCESS BLDA CALC-SCNC: -17 MMOL/L (ref -3–3)
BASE EXCESS BLDA CALC-SCNC: -19 MMOL/L (ref -3–3)
BASE EXCESS BLDA CALC-SCNC: -22 MMOL/L (ref -3–3)
BASE EXCESS BLDA CALC-SCNC: -6 MMOL/L (ref -3–3)
BASE EXCESS BLDA CALC-SCNC: -6 MMOL/L (ref -3–3)
BASE EXCESS BLDA CALC-SCNC: 0 MMOL/L (ref -3–3)
BASE EXCESS BLDA CALC-SCNC: 10 MMOL/L (ref -3–3)
BASE EXCESS BLDA CALC-SCNC: 14 MMOL/L (ref -3–3)
BASE EXCESS BLDA CALC-SCNC: 3 MMOL/L (ref -3–3)
BASE EXCESS BLDA CALC-SCNC: 8.9 MMOL/L (ref -3–3)
BASE EXCESS BLDV CALC-SCNC: -16.9 MMOL/L
BASE EXCESS BLDV CALC-SCNC: -17.2 MMOL/L
BASE EXCESS BLDV CALC-SCNC: -25.6 MMOL/L
BASOPHILS # BLD: 0 K/UL (ref 0–0.2)
BASOPHILS # BLD: 0.1 K/UL (ref 0–0.2)
BASOPHILS # BLD: 0.2 K/UL (ref 0–0.2)
BASOPHILS # BLD: 0.2 K/UL (ref 0–0.2)
BASOPHILS NFR BLD: 0 %
BASOPHILS NFR BLD: 0.4 %
BASOPHILS NFR BLD: 1 %
BASOPHILS NFR BLD: 1 %
BILIRUB DIRECT SERPL-MCNC: 0.4 MG/DL (ref 0–0.3)
BILIRUB DIRECT SERPL-MCNC: 0.4 MG/DL (ref 0–0.3)
BILIRUB INDIRECT SERPL-MCNC: 0.3 MG/DL (ref 0–1)
BILIRUB INDIRECT SERPL-MCNC: 0.4 MG/DL (ref 0–1)
BILIRUB SERPL-MCNC: 0.4 MG/DL (ref 0–1)
BILIRUB SERPL-MCNC: 0.7 MG/DL (ref 0–1)
BILIRUB SERPL-MCNC: 0.8 MG/DL (ref 0–1)
BLD GP AB SCN SERPL QL: NORMAL
BLOOD BANK DISPENSE STATUS: NORMAL
BLOOD BANK PRODUCT CODE: NORMAL
BPU ID: NORMAL
BUN SERPL-MCNC: 13 MG/DL (ref 7–20)
BUN SERPL-MCNC: 20 MG/DL (ref 7–20)
BUN SERPL-MCNC: 33 MG/DL (ref 7–20)
BUN SERPL-MCNC: 41 MG/DL (ref 7–20)
BUN SERPL-MCNC: 44 MG/DL (ref 7–20)
BUN SERPL-MCNC: 5 MG/DL (ref 7–20)
BUN SERPL-MCNC: 6 MG/DL (ref 7–20)
BUN SERPL-MCNC: 7 MG/DL (ref 7–20)
CA-I BLD-SCNC: 0.66 MMOL/L (ref 1.12–1.32)
CA-I BLD-SCNC: 0.72 MMOL/L (ref 1.12–1.32)
CA-I BLD-SCNC: 0.75 MMOL/L (ref 1.12–1.32)
CA-I BLD-SCNC: 0.81 MMOL/L (ref 1.12–1.32)
CA-I BLD-SCNC: 0.84 MMOL/L (ref 1.12–1.32)
CA-I BLD-SCNC: 0.85 MMOL/L (ref 1.12–1.32)
CA-I BLD-SCNC: 0.87 MMOL/L (ref 1.12–1.32)
CA-I BLD-SCNC: 0.88 MMOL/L (ref 1.12–1.32)
CA-I BLD-SCNC: 1.01 MMOL/L (ref 1.12–1.32)
CALCIUM SERPL-MCNC: 4.5 MG/DL (ref 8.3–10.6)
CALCIUM SERPL-MCNC: 5.1 MG/DL (ref 8.3–10.6)
CALCIUM SERPL-MCNC: 5.7 MG/DL (ref 8.3–10.6)
CALCIUM SERPL-MCNC: 5.9 MG/DL (ref 8.3–10.6)
CALCIUM SERPL-MCNC: 6.1 MG/DL (ref 8.3–10.6)
CALCIUM SERPL-MCNC: 6.4 MG/DL (ref 8.3–10.6)
CALCIUM SERPL-MCNC: 6.8 MG/DL (ref 8.3–10.6)
CALCIUM SERPL-MCNC: 7.6 MG/DL (ref 8.3–10.6)
CHLORIDE SERPL-SCNC: 104 MMOL/L (ref 99–110)
CHLORIDE SERPL-SCNC: 83 MMOL/L (ref 99–110)
CHLORIDE SERPL-SCNC: 86 MMOL/L (ref 99–110)
CHLORIDE SERPL-SCNC: 87 MMOL/L (ref 99–110)
CHLORIDE SERPL-SCNC: 91 MMOL/L (ref 99–110)
CHLORIDE SERPL-SCNC: 96 MMOL/L (ref 99–110)
CHLORIDE SERPL-SCNC: 97 MMOL/L (ref 99–110)
CHLORIDE SERPL-SCNC: 99 MMOL/L (ref 99–110)
CK SERPL-CCNC: ABNORMAL U/L (ref 26–192)
CO2 BLDA-SCNC: 11 MMOL/L
CO2 BLDA-SCNC: 11 MMOL/L
CO2 BLDA-SCNC: 14 MMOL/L
CO2 BLDA-SCNC: 20 MMOL/L
CO2 BLDA-SCNC: 22 MMOL/L
CO2 BLDA-SCNC: 27 MMOL/L
CO2 BLDA-SCNC: 31 MMOL/L
CO2 BLDA-SCNC: 37.5 MMOL/L
CO2 BLDA-SCNC: 38 MMOL/L
CO2 BLDA-SCNC: 42 MMOL/L
CO2 BLDV-SCNC: 14 MMOL/L
CO2 BLDV-SCNC: 16 MMOL/L
CO2 BLDV-SCNC: 8 MMOL/L
CO2 SERPL-SCNC: 10 MMOL/L (ref 21–32)
CO2 SERPL-SCNC: 13 MMOL/L (ref 21–32)
CO2 SERPL-SCNC: 14 MMOL/L (ref 21–32)
CO2 SERPL-SCNC: 17 MMOL/L (ref 21–32)
CO2 SERPL-SCNC: 23 MMOL/L (ref 21–32)
CO2 SERPL-SCNC: 3 MMOL/L (ref 21–32)
CO2 SERPL-SCNC: 36 MMOL/L (ref 21–32)
CO2 SERPL-SCNC: 8 MMOL/L (ref 21–32)
COHGB MFR BLDA: 0.9 % (ref 0–1.5)
COHGB MFR BLDV: 0.7 %
COHGB MFR BLDV: 1.4 %
COHGB MFR BLDV: 2.3 %
CREAT SERPL-MCNC: 0.7 MG/DL (ref 0.6–1.1)
CREAT SERPL-MCNC: 1.2 MG/DL (ref 0.6–1.1)
CREAT SERPL-MCNC: 1.9 MG/DL (ref 0.6–1.1)
CREAT SERPL-MCNC: 3 MG/DL (ref 0.6–1.1)
CREAT SERPL-MCNC: 3.7 MG/DL (ref 0.6–1.1)
CREAT SERPL-MCNC: 4.5 MG/DL (ref 0.6–1.1)
CREAT SERPL-MCNC: <0.5 MG/DL (ref 0.6–1.1)
CREAT SERPL-MCNC: <0.5 MG/DL (ref 0.6–1.1)
D DIMER: >20 UG/ML FEU (ref 0–0.6)
DEPRECATED RDW RBC AUTO: 13 % (ref 12.4–15.4)
DEPRECATED RDW RBC AUTO: 13.6 % (ref 12.4–15.4)
DEPRECATED RDW RBC AUTO: 13.8 % (ref 12.4–15.4)
DEPRECATED RDW RBC AUTO: 14 % (ref 12.4–15.4)
DEPRECATED RDW RBC AUTO: 14.1 % (ref 12.4–15.4)
DEPRECATED RDW RBC AUTO: 14.3 % (ref 12.4–15.4)
DESCRIPTION BLOOD BANK: NORMAL
DOHLE BOD BLD QL SMEAR: PRESENT
DOHLE BOD BLD QL SMEAR: PRESENT
EKG ATRIAL RATE: 106 BPM
EKG DIAGNOSIS: NORMAL
EKG P AXIS: 36 DEGREES
EKG P-R INTERVAL: 146 MS
EKG Q-T INTERVAL: 324 MS
EKG QRS DURATION: 80 MS
EKG QTC CALCULATION (BAZETT): 430 MS
EKG R AXIS: 72 DEGREES
EKG T AXIS: 62 DEGREES
EKG VENTRICULAR RATE: 106 BPM
EOSINOPHIL # BLD: 0 K/UL (ref 0–0.6)
EOSINOPHIL # BLD: 0.1 K/UL (ref 0–0.6)
EOSINOPHIL # BLD: 0.2 K/UL (ref 0–0.6)
EOSINOPHIL NFR BLD: 0 %
EOSINOPHIL NFR BLD: 0.5 %
EOSINOPHIL NFR BLD: 1 %
ETHANOLAMINE SERPL-MCNC: NORMAL MG/DL (ref 0–0.08)
FIBRINOGEN PPP-MCNC: >1000 MG/DL (ref 243–550)
GFR SERPLBLD CREATININE-BSD FMLA CKD-EPI: 12 ML/MIN/{1.73_M2}
GFR SERPLBLD CREATININE-BSD FMLA CKD-EPI: 16 ML/MIN/{1.73_M2}
GFR SERPLBLD CREATININE-BSD FMLA CKD-EPI: 20 ML/MIN/{1.73_M2}
GFR SERPLBLD CREATININE-BSD FMLA CKD-EPI: 35 ML/MIN/{1.73_M2}
GFR SERPLBLD CREATININE-BSD FMLA CKD-EPI: >60 ML/MIN/{1.73_M2}
GLUCOSE BLD-MCNC: 119 MG/DL (ref 70–99)
GLUCOSE BLD-MCNC: 121 MG/DL (ref 70–99)
GLUCOSE BLD-MCNC: 147 MG/DL (ref 70–99)
GLUCOSE BLD-MCNC: 179 MG/DL (ref 70–99)
GLUCOSE BLD-MCNC: 214 MG/DL (ref 70–99)
GLUCOSE BLD-MCNC: 28 MG/DL (ref 70–99)
GLUCOSE BLD-MCNC: 38 MG/DL (ref 70–99)
GLUCOSE BLD-MCNC: 488 MG/DL (ref 70–99)
GLUCOSE BLD-MCNC: 51 MG/DL (ref 70–99)
GLUCOSE BLD-MCNC: 585 MG/DL (ref 70–99)
GLUCOSE BLD-MCNC: 60 MG/DL (ref 70–99)
GLUCOSE BLD-MCNC: 673 MG/DL (ref 70–99)
GLUCOSE BLD-MCNC: 74 MG/DL (ref 70–99)
GLUCOSE BLD-MCNC: 80 MG/DL (ref 70–99)
GLUCOSE BLD-MCNC: 84 MG/DL (ref 70–99)
GLUCOSE SERPL-MCNC: 145 MG/DL (ref 70–99)
GLUCOSE SERPL-MCNC: 21 MG/DL (ref 70–99)
GLUCOSE SERPL-MCNC: 580 MG/DL (ref 70–99)
GLUCOSE SERPL-MCNC: 59 MG/DL (ref 70–99)
GLUCOSE SERPL-MCNC: 636 MG/DL (ref 70–99)
GLUCOSE SERPL-MCNC: 71 MG/DL (ref 70–99)
GLUCOSE SERPL-MCNC: 71 MG/DL (ref 70–99)
GLUCOSE SERPL-MCNC: 83 MG/DL (ref 70–99)
HAPTOGLOB SERPL-MCNC: 223 MG/DL (ref 30–200)
HCG SERPL QL: NEGATIVE
HCO3 BLDA-SCNC: 12.7 MMOL/L (ref 21–29)
HCO3 BLDA-SCNC: 19.2 MMOL/L (ref 21–29)
HCO3 BLDA-SCNC: 20.4 MMOL/L (ref 21–29)
HCO3 BLDA-SCNC: 25.5 MMOL/L (ref 21–29)
HCO3 BLDA-SCNC: 29.1 MMOL/L (ref 21–29)
HCO3 BLDA-SCNC: 35.5 MMOL/L (ref 21–29)
HCO3 BLDA-SCNC: 35.7 MMOL/L (ref 21–29)
HCO3 BLDA-SCNC: 40.1 MMOL/L (ref 21–29)
HCO3 BLDA-SCNC: 9.7 MMOL/L (ref 21–29)
HCO3 BLDA-SCNC: 9.9 MMOL/L (ref 21–29)
HCO3 BLDV-SCNC: 13 MMOL/L (ref 23–29)
HCO3 BLDV-SCNC: 14 MMOL/L (ref 23–29)
HCO3 BLDV-SCNC: 7 MMOL/L (ref 23–29)
HCT VFR BLD AUTO: 14.5 % (ref 36–48)
HCT VFR BLD AUTO: 25.7 % (ref 36–48)
HCT VFR BLD AUTO: 30.6 % (ref 36–48)
HCT VFR BLD AUTO: 31 % (ref 36–48)
HCT VFR BLD AUTO: 31.1 % (ref 36–48)
HCT VFR BLD AUTO: 31.4 % (ref 36–48)
HCT VFR BLD AUTO: 33.4 % (ref 36–48)
HCT VFR BLD AUTO: 34 % (ref 36–48)
HCT VFR BLD AUTO: 34 % (ref 36–48)
HEMOCCULT STL QL: ABNORMAL
HGB BLD CALC-MCNC: 11.5 GM/DL (ref 12–16)
HGB BLD CALC-MCNC: 11.5 GM/DL (ref 12–16)
HGB BLD-MCNC: 10.1 G/DL (ref 12–16)
HGB BLD-MCNC: 10.2 G/DL (ref 12–16)
HGB BLD-MCNC: 10.6 G/DL (ref 12–16)
HGB BLD-MCNC: 10.6 G/DL (ref 12–16)
HGB BLD-MCNC: 10.7 G/DL (ref 12–16)
HGB BLD-MCNC: 5.1 G/DL (ref 12–16)
HGB BLD-MCNC: 8.9 G/DL (ref 12–16)
HGB BLDA-MCNC: 5.1 G/DL (ref 12–16)
INR PPP: >16.29 (ref 0.84–1.16)
LACTATE BLD-SCNC: 11.76 MMOL/L (ref 0.4–2)
LACTATE BLD-SCNC: 17.58 MMOL/L (ref 0.4–2)
LACTATE BLDV-SCNC: 11.2 MMOL/L (ref 0.4–2)
LACTATE BLDV-SCNC: 16.8 MMOL/L (ref 0.4–2)
LACTATE BLDV-SCNC: 17.8 MMOL/L (ref 0.4–2)
LACTATE BLDV-SCNC: 17.9 MMOL/L (ref 0.4–2)
LACTATE BLDV-SCNC: 6.8 MMOL/L (ref 0.4–2)
LACTATE BLDV-SCNC: 8.7 MMOL/L (ref 0.4–2)
LYMPHOCYTES # BLD: 2.4 K/UL (ref 1–5.1)
LYMPHOCYTES # BLD: 2.4 K/UL (ref 1–5.1)
LYMPHOCYTES # BLD: 2.9 K/UL (ref 1–5.1)
LYMPHOCYTES # BLD: 3 K/UL (ref 1–5.1)
LYMPHOCYTES # BLD: 3.2 K/UL (ref 1–5.1)
LYMPHOCYTES # BLD: 5.7 K/UL (ref 1–5.1)
LYMPHOCYTES NFR BLD: 15 %
LYMPHOCYTES NFR BLD: 17 %
LYMPHOCYTES NFR BLD: 17.5 %
LYMPHOCYTES NFR BLD: 21 %
LYMPHOCYTES NFR BLD: 29 %
LYMPHOCYTES NFR BLD: 31 %
MAGNESIUM SERPL-MCNC: 2 MG/DL (ref 1.8–2.4)
MAGNESIUM SERPL-MCNC: 2.3 MG/DL (ref 1.8–2.4)
MAGNESIUM SERPL-MCNC: 2.5 MG/DL (ref 1.8–2.4)
MAGNESIUM SERPL-MCNC: 3.3 MG/DL (ref 1.8–2.4)
MAGNESIUM SERPL-MCNC: 4 MG/DL (ref 1.8–2.4)
MCH RBC QN AUTO: 29.9 PG (ref 26–34)
MCH RBC QN AUTO: 30 PG (ref 26–34)
MCH RBC QN AUTO: 30.1 PG (ref 26–34)
MCH RBC QN AUTO: 30.3 PG (ref 26–34)
MCH RBC QN AUTO: 30.6 PG (ref 26–34)
MCH RBC QN AUTO: 30.9 PG (ref 26–34)
MCHC RBC AUTO-ENTMCNC: 32.8 G/DL (ref 31–36)
MCHC RBC AUTO-ENTMCNC: 33 G/DL (ref 31–36)
MCHC RBC AUTO-ENTMCNC: 33.6 G/DL (ref 31–36)
MCHC RBC AUTO-ENTMCNC: 34.3 G/DL (ref 31–36)
MCHC RBC AUTO-ENTMCNC: 34.4 G/DL (ref 31–36)
MCHC RBC AUTO-ENTMCNC: 35.4 G/DL (ref 31–36)
MCV RBC AUTO: 87 FL (ref 80–100)
MCV RBC AUTO: 87.4 FL (ref 80–100)
MCV RBC AUTO: 89.3 FL (ref 80–100)
MCV RBC AUTO: 90 FL (ref 80–100)
MCV RBC AUTO: 90.5 FL (ref 80–100)
MCV RBC AUTO: 91.8 FL (ref 80–100)
METAMYELOCYTES NFR BLD MANUAL: 1 %
METAMYELOCYTES NFR BLD MANUAL: 1 %
METAMYELOCYTES NFR BLD MANUAL: 2 %
METAMYELOCYTES NFR BLD MANUAL: 4 %
METHGB MFR BLDA: 0.8 %
METHGB MFR BLDV: 0.8 %
METHGB MFR BLDV: 1 %
METHGB MFR BLDV: 1.2 %
MONOCYTES # BLD: 0.2 K/UL (ref 0–1.3)
MONOCYTES # BLD: 0.3 K/UL (ref 0–1.3)
MONOCYTES # BLD: 0.6 K/UL (ref 0–1.3)
MONOCYTES # BLD: 1.1 K/UL (ref 0–1.3)
MONOCYTES # BLD: 1.4 K/UL (ref 0–1.3)
MONOCYTES # BLD: 1.9 K/UL (ref 0–1.3)
MONOCYTES NFR BLD: 10 %
MONOCYTES NFR BLD: 11 %
MONOCYTES NFR BLD: 2 %
MONOCYTES NFR BLD: 2.4 %
MONOCYTES NFR BLD: 3 %
MONOCYTES NFR BLD: 7 %
MYELOCYTES NFR BLD MANUAL: 2 %
NEUTROPHILS # BLD: 10.9 K/UL (ref 1.7–7.7)
NEUTROPHILS # BLD: 12.5 K/UL (ref 1.7–7.7)
NEUTROPHILS # BLD: 14.8 K/UL (ref 1.7–7.7)
NEUTROPHILS # BLD: 7 K/UL (ref 1.7–7.7)
NEUTROPHILS # BLD: 9.4 K/UL (ref 1.7–7.7)
NEUTROPHILS # BLD: 9.5 K/UL (ref 1.7–7.7)
NEUTROPHILS NFR BLD: 44 %
NEUTROPHILS NFR BLD: 49 %
NEUTROPHILS NFR BLD: 66 %
NEUTROPHILS NFR BLD: 69 %
NEUTROPHILS NFR BLD: 77 %
NEUTROPHILS NFR BLD: 79.2 %
NEUTS BAND NFR BLD MANUAL: 16 % (ref 0–7)
NEUTS BAND NFR BLD MANUAL: 3 % (ref 0–7)
NEUTS BAND NFR BLD MANUAL: 6 % (ref 0–7)
NEUTS BAND NFR BLD MANUAL: 9 % (ref 0–7)
NEUTS VAC BLD QL SMEAR: PRESENT
NRBC BLD-RTO: 1 /100 WBC
NRBC BLD-RTO: 1 /100 WBC
NRBC BLD-RTO: 4 /100 WBC
NRBC BLD-RTO: 5 /100 WBC
O2 THERAPY: ABNORMAL
PATH INTERP BLD-IMP: NORMAL
PATH INTERP BLD-IMP: YES
PCO2 BLDA: 29.4 MM HG (ref 35–45)
PCO2 BLDA: 30.9 MM HG (ref 35–45)
PCO2 BLDA: 39.5 MM HG (ref 35–45)
PCO2 BLDA: 39.9 MM HG (ref 35–45)
PCO2 BLDA: 41.6 MM HG (ref 35–45)
PCO2 BLDA: 43.8 MM HG (ref 35–45)
PCO2 BLDA: 54.2 MM HG (ref 35–45)
PCO2 BLDA: 67.1 MM HG (ref 35–45)
PCO2 BLDA: 67.6 MMHG (ref 35–45)
PCO2 BLDA: 75.3 MM HG (ref 35–45)
PCO2 BLDV: 35 MMHG (ref 40–50)
PCO2 BLDV: 47.7 MMHG (ref 40–50)
PCO2 BLDV: 60.9 MMHG (ref 40–50)
PERFORMED ON: ABNORMAL
PH BLDA: 7 [PH] (ref 7.35–7.45)
PH BLDA: 7.11 [PH] (ref 7.35–7.45)
PH BLDA: 7.13 [PH] (ref 7.35–7.45)
PH BLDA: 7.3 [PH] (ref 7.35–7.45)
PH BLDA: 7.33 [PH] (ref 7.35–7.45)
PH BLDA: 7.34 [PH] (ref 7.35–7.45)
PH BLDA: 7.37 [PH] (ref 7.35–7.45)
PH BLDA: 7.4 [PH] (ref 7.35–7.45)
PH BLDV: 6.88 [PH] (ref 7.35–7.45)
PH BLDV: 6.91 [PH] (ref 7.35–7.45)
PH BLDV: 6.98 [PH] (ref 7.35–7.45)
PH BLDV: 7.04 [PH] (ref 7.35–7.45)
PH BLDV: 7.08 [PH] (ref 7.35–7.45)
PH BLDV: 7.34 [PH] (ref 7.35–7.45)
PH BLDV: 7.35 [PH] (ref 7.35–7.45)
PHOSPHATE SERPL-MCNC: 13.1 MG/DL (ref 2.5–4.9)
PHOSPHATE SERPL-MCNC: 18.4 MG/DL (ref 2.5–4.9)
PHOSPHATE SERPL-MCNC: 6 MG/DL (ref 2.5–4.9)
PHOSPHATE SERPL-MCNC: 7.3 MG/DL (ref 2.5–4.9)
PHOSPHATE SERPL-MCNC: 8.8 MG/DL (ref 2.5–4.9)
PHOSPHATE SERPL-MCNC: 8.8 MG/DL (ref 2.5–4.9)
PLATELET # BLD AUTO: 182 K/UL (ref 135–450)
PLATELET # BLD AUTO: 21 K/UL (ref 135–450)
PLATELET # BLD AUTO: 25 K/UL (ref 135–450)
PLATELET # BLD AUTO: 25 K/UL (ref 135–450)
PLATELET # BLD AUTO: 27 K/UL (ref 135–450)
PLATELET # BLD AUTO: 29 K/UL (ref 135–450)
PLATELET BLD QL SMEAR: ABNORMAL
PLATELET BLD QL SMEAR: ADEQUATE
PMV BLD AUTO: 7.6 FL (ref 5–10.5)
PMV BLD AUTO: 8.4 FL (ref 5–10.5)
PMV BLD AUTO: 8.5 FL (ref 5–10.5)
PMV BLD AUTO: 8.5 FL (ref 5–10.5)
PMV BLD AUTO: 8.7 FL (ref 5–10.5)
PMV BLD AUTO: 9.3 FL (ref 5–10.5)
PO2 BLDA: 102.1 MM HG (ref 75–108)
PO2 BLDA: 67.1 MM HG (ref 75–108)
PO2 BLDA: 67.1 MM HG (ref 75–108)
PO2 BLDA: 68.9 MM HG (ref 75–108)
PO2 BLDA: 77.9 MMHG (ref 75–108)
PO2 BLDA: 81 MM HG (ref 75–108)
PO2 BLDA: 84.8 MM HG (ref 75–108)
PO2 BLDA: 92.3 MM HG (ref 75–108)
PO2 BLDA: 93.9 MM HG (ref 75–108)
PO2 BLDA: 98.3 MM HG (ref 75–108)
PO2 BLDV: 139 MMHG
PO2 BLDV: 56 MMHG
PO2 BLDV: 57 MMHG
POC SAMPLE TYPE: ABNORMAL
POLYCHROMASIA BLD QL SMEAR: ABNORMAL
POLYCHROMASIA BLD QL SMEAR: ABNORMAL
POTASSIUM BLD-SCNC: 4.2 MMOL/L (ref 3.5–5.1)
POTASSIUM BLD-SCNC: 4.5 MMOL/L (ref 3.5–5.1)
POTASSIUM BLD-SCNC: 5.6 MMOL/L (ref 3.5–5.1)
POTASSIUM BLD-SCNC: 6 MMOL/L (ref 3.5–5.1)
POTASSIUM SERPL-SCNC: 3.8 MMOL/L (ref 3.5–5.1)
POTASSIUM SERPL-SCNC: 4.4 MMOL/L (ref 3.5–5.1)
POTASSIUM SERPL-SCNC: 5.3 MMOL/L (ref 3.5–5.1)
POTASSIUM SERPL-SCNC: 5.7 MMOL/L (ref 3.5–5.1)
POTASSIUM SERPL-SCNC: 5.9 MMOL/L (ref 3.5–5.1)
POTASSIUM SERPL-SCNC: 5.9 MMOL/L (ref 3.5–5.1)
POTASSIUM SERPL-SCNC: 7.3 MMOL/L (ref 3.5–5.1)
POTASSIUM SERPL-SCNC: 8.5 MMOL/L (ref 3.5–5.1)
POTASSIUM SERPL-SCNC: 8.6 MMOL/L (ref 3.5–5.1)
PROCALCITONIN SERPL IA-MCNC: 5 NG/ML (ref 0–0.15)
PROT SERPL-MCNC: 2.5 G/DL (ref 6.4–8.2)
PROT SERPL-MCNC: 5 G/DL (ref 6.4–8.2)
PROT SERPL-MCNC: 5.9 G/DL (ref 6.4–8.2)
PROTHROMBIN TIME: >120 SEC (ref 11.5–14.8)
RBC # BLD AUTO: 1.65 M/UL (ref 4–5.2)
RBC # BLD AUTO: 2.96 M/UL (ref 4–5.2)
RBC # BLD AUTO: 3.38 M/UL (ref 4–5.2)
RBC # BLD AUTO: 3.39 M/UL (ref 4–5.2)
RBC # BLD AUTO: 3.47 M/UL (ref 4–5.2)
RBC # BLD AUTO: 3.49 M/UL (ref 4–5.2)
RBC MORPH BLD: NORMAL
REASON FOR REJECTION: NORMAL
REJECTED TEST: NORMAL
SALICYLATES SERPL-MCNC: 0.8 MG/DL (ref 15–30)
SAO2 % BLDA: 90 % (ref 93–100)
SAO2 % BLDA: 91 % (ref 93–100)
SAO2 % BLDA: 92 % (ref 93–100)
SAO2 % BLDA: 93.2 %
SAO2 % BLDA: 96 % (ref 93–100)
SAO2 % BLDA: 96 % (ref 93–100)
SAO2 % BLDA: 97 % (ref 93–100)
SAO2 % BLDA: 97 % (ref 93–100)
SAO2 % BLDV: 72 %
SAO2 % BLDV: 75 %
SAO2 % BLDV: 97 %
SARS-COV-2 RDRP RESP QL NAA+PROBE: NOT DETECTED
SLIDE REVIEW: ABNORMAL
SODIUM BLD-SCNC: 129 MMOL/L (ref 136–145)
SODIUM BLD-SCNC: 136 MMOL/L (ref 136–145)
SODIUM BLD-SCNC: 140 MMOL/L (ref 136–145)
SODIUM SERPL-SCNC: 130 MMOL/L (ref 136–145)
SODIUM SERPL-SCNC: 131 MMOL/L (ref 136–145)
SODIUM SERPL-SCNC: 132 MMOL/L (ref 136–145)
SODIUM SERPL-SCNC: 134 MMOL/L (ref 136–145)
SODIUM SERPL-SCNC: 136 MMOL/L (ref 136–145)
SODIUM SERPL-SCNC: 137 MMOL/L (ref 136–145)
SODIUM SERPL-SCNC: 139 MMOL/L (ref 136–145)
SODIUM SERPL-SCNC: 139 MMOL/L (ref 136–145)
TOXIC GRANULES BLD QL SMEAR: PRESENT
TOXIC GRANULES BLD QL SMEAR: PRESENT
VARIANT LYMPHS NFR BLD MANUAL: 2 % (ref 0–6)
WBC # BLD AUTO: 10.2 K/UL (ref 4–11)
WBC # BLD AUTO: 13.8 K/UL (ref 4–11)
WBC # BLD AUTO: 13.8 K/UL (ref 4–11)
WBC # BLD AUTO: 16 K/UL (ref 4–11)
WBC # BLD AUTO: 17.4 K/UL (ref 4–11)
WBC # BLD AUTO: 18.7 K/UL (ref 4–11)

## 2023-01-01 PROCEDURE — 93005 ELECTROCARDIOGRAM TRACING: CPT | Performed by: PHYSICIAN ASSISTANT

## 2023-01-01 PROCEDURE — 2500000003 HC RX 250 WO HCPCS

## 2023-01-01 PROCEDURE — 85018 HEMOGLOBIN: CPT

## 2023-01-01 PROCEDURE — 71045 X-RAY EXAM CHEST 1 VIEW: CPT

## 2023-01-01 PROCEDURE — P9016 RBC LEUKOCYTES REDUCED: HCPCS

## 2023-01-01 PROCEDURE — 2580000003 HC RX 258: Performed by: STUDENT IN AN ORGANIZED HEALTH CARE EDUCATION/TRAINING PROGRAM

## 2023-01-01 PROCEDURE — 5A1D70Z PERFORMANCE OF URINARY FILTRATION, INTERMITTENT, LESS THAN 6 HOURS PER DAY: ICD-10-PCS | Performed by: INTERNAL MEDICINE

## 2023-01-01 PROCEDURE — 02HV33Z INSERTION OF INFUSION DEVICE INTO SUPERIOR VENA CAVA, PERCUTANEOUS APPROACH: ICD-10-PCS | Performed by: INTERNAL MEDICINE

## 2023-01-01 PROCEDURE — 96374 THER/PROPH/DIAG INJ IV PUSH: CPT

## 2023-01-01 PROCEDURE — 99291 CRITICAL CARE FIRST HOUR: CPT | Performed by: INTERNAL MEDICINE

## 2023-01-01 PROCEDURE — 5A1945Z RESPIRATORY VENTILATION, 24-96 CONSECUTIVE HOURS: ICD-10-PCS | Performed by: INTERNAL MEDICINE

## 2023-01-01 PROCEDURE — 85384 FIBRINOGEN ACTIVITY: CPT

## 2023-01-01 PROCEDURE — 87635 SARS-COV-2 COVID-19 AMP PRB: CPT

## 2023-01-01 PROCEDURE — 31500 INSERT EMERGENCY AIRWAY: CPT

## 2023-01-01 PROCEDURE — 84145 PROCALCITONIN (PCT): CPT

## 2023-01-01 PROCEDURE — 85025 COMPLETE CBC W/AUTO DIFF WBC: CPT

## 2023-01-01 PROCEDURE — 6360000002 HC RX W HCPCS: Performed by: INTERNAL MEDICINE

## 2023-01-01 PROCEDURE — 82947 ASSAY GLUCOSE BLOOD QUANT: CPT

## 2023-01-01 PROCEDURE — 6360000002 HC RX W HCPCS: Performed by: STUDENT IN AN ORGANIZED HEALTH CARE EDUCATION/TRAINING PROGRAM

## 2023-01-01 PROCEDURE — 80143 DRUG ASSAY ACETAMINOPHEN: CPT

## 2023-01-01 PROCEDURE — 86850 RBC ANTIBODY SCREEN: CPT

## 2023-01-01 PROCEDURE — 84132 ASSAY OF SERUM POTASSIUM: CPT

## 2023-01-01 PROCEDURE — 04HY32Z INSERTION OF MONITORING DEVICE INTO LOWER ARTERY, PERCUTANEOUS APPROACH: ICD-10-PCS | Performed by: INTERNAL MEDICINE

## 2023-01-01 PROCEDURE — 5A1D90Z PERFORMANCE OF URINARY FILTRATION, CONTINUOUS, GREATER THAN 18 HOURS PER DAY: ICD-10-PCS | Performed by: HOSPITALIST

## 2023-01-01 PROCEDURE — 85014 HEMATOCRIT: CPT

## 2023-01-01 PROCEDURE — 2500000003 HC RX 250 WO HCPCS: Performed by: STUDENT IN AN ORGANIZED HEALTH CARE EDUCATION/TRAINING PROGRAM

## 2023-01-01 PROCEDURE — 2000000000 HC ICU R&B

## 2023-01-01 PROCEDURE — 86900 BLOOD TYPING SEROLOGIC ABO: CPT

## 2023-01-01 PROCEDURE — 85730 THROMBOPLASTIN TIME PARTIAL: CPT

## 2023-01-01 PROCEDURE — 80076 HEPATIC FUNCTION PANEL: CPT

## 2023-01-01 PROCEDURE — 84100 ASSAY OF PHOSPHORUS: CPT

## 2023-01-01 PROCEDURE — 6360000002 HC RX W HCPCS: Performed by: PHYSICIAN ASSISTANT

## 2023-01-01 PROCEDURE — 2580000003 HC RX 258: Performed by: INTERNAL MEDICINE

## 2023-01-01 PROCEDURE — 85610 PROTHROMBIN TIME: CPT

## 2023-01-01 PROCEDURE — 36415 COLL VENOUS BLD VENIPUNCTURE: CPT

## 2023-01-01 PROCEDURE — 37799 UNLISTED PX VASCULAR SURGERY: CPT

## 2023-01-01 PROCEDURE — 36600 WITHDRAWAL OF ARTERIAL BLOOD: CPT

## 2023-01-01 PROCEDURE — 80048 BASIC METABOLIC PNL TOTAL CA: CPT

## 2023-01-01 PROCEDURE — 84295 ASSAY OF SERUM SODIUM: CPT

## 2023-01-01 PROCEDURE — 84703 CHORIONIC GONADOTROPIN ASSAY: CPT

## 2023-01-01 PROCEDURE — 90945 DIALYSIS ONE EVALUATION: CPT

## 2023-01-01 PROCEDURE — 4A133J1 MONITORING OF ARTERIAL PULSE, PERIPHERAL, PERCUTANEOUS APPROACH: ICD-10-PCS | Performed by: INTERNAL MEDICINE

## 2023-01-01 PROCEDURE — 82270 OCCULT BLOOD FECES: CPT

## 2023-01-01 PROCEDURE — 83735 ASSAY OF MAGNESIUM: CPT

## 2023-01-01 PROCEDURE — 82330 ASSAY OF CALCIUM: CPT

## 2023-01-01 PROCEDURE — 96372 THER/PROPH/DIAG INJ SC/IM: CPT

## 2023-01-01 PROCEDURE — 6370000000 HC RX 637 (ALT 250 FOR IP): Performed by: INTERNAL MEDICINE

## 2023-01-01 PROCEDURE — 80069 RENAL FUNCTION PANEL: CPT

## 2023-01-01 PROCEDURE — 30233N1 TRANSFUSION OF NONAUTOLOGOUS RED BLOOD CELLS INTO PERIPHERAL VEIN, PERCUTANEOUS APPROACH: ICD-10-PCS | Performed by: INTERNAL MEDICINE

## 2023-01-01 PROCEDURE — 93010 ELECTROCARDIOGRAM REPORT: CPT | Performed by: INTERNAL MEDICINE

## 2023-01-01 PROCEDURE — 86923 COMPATIBILITY TEST ELECTRIC: CPT

## 2023-01-01 PROCEDURE — 80053 COMPREHEN METABOLIC PANEL: CPT

## 2023-01-01 PROCEDURE — 94003 VENT MGMT INPAT SUBQ DAY: CPT

## 2023-01-01 PROCEDURE — 6360000002 HC RX W HCPCS: Performed by: EMERGENCY MEDICINE

## 2023-01-01 PROCEDURE — 82077 ASSAY SPEC XCP UR&BREATH IA: CPT

## 2023-01-01 PROCEDURE — 82550 ASSAY OF CK (CPK): CPT

## 2023-01-01 PROCEDURE — 2500000003 HC RX 250 WO HCPCS: Performed by: INTERNAL MEDICINE

## 2023-01-01 PROCEDURE — 99285 EMERGENCY DEPT VISIT HI MDM: CPT

## 2023-01-01 PROCEDURE — 83605 ASSAY OF LACTIC ACID: CPT

## 2023-01-01 PROCEDURE — C9113 INJ PANTOPRAZOLE SODIUM, VIA: HCPCS | Performed by: STUDENT IN AN ORGANIZED HEALTH CARE EDUCATION/TRAINING PROGRAM

## 2023-01-01 PROCEDURE — 4A133B1 MONITORING OF ARTERIAL PRESSURE, PERIPHERAL, PERCUTANEOUS APPROACH: ICD-10-PCS | Performed by: INTERNAL MEDICINE

## 2023-01-01 PROCEDURE — 95819 EEG AWAKE AND ASLEEP: CPT

## 2023-01-01 PROCEDURE — 87040 BLOOD CULTURE FOR BACTERIA: CPT

## 2023-01-01 PROCEDURE — 36430 TRANSFUSION BLD/BLD COMPNT: CPT

## 2023-01-01 PROCEDURE — 2580000003 HC RX 258: Performed by: PHYSICIAN ASSISTANT

## 2023-01-01 PROCEDURE — 96375 TX/PRO/DX INJ NEW DRUG ADDON: CPT

## 2023-01-01 PROCEDURE — 85379 FIBRIN DEGRADATION QUANT: CPT

## 2023-01-01 PROCEDURE — 82803 BLOOD GASES ANY COMBINATION: CPT

## 2023-01-01 PROCEDURE — 2700000000 HC OXYGEN THERAPY PER DAY

## 2023-01-01 PROCEDURE — 0BH17EZ INSERTION OF ENDOTRACHEAL AIRWAY INTO TRACHEA, VIA NATURAL OR ARTIFICIAL OPENING: ICD-10-PCS | Performed by: INTERNAL MEDICINE

## 2023-01-01 PROCEDURE — 80179 DRUG ASSAY SALICYLATE: CPT

## 2023-01-01 PROCEDURE — 2500000003 HC RX 250 WO HCPCS: Performed by: PHYSICIAN ASSISTANT

## 2023-01-01 PROCEDURE — 96360 HYDRATION IV INFUSION INIT: CPT

## 2023-01-01 PROCEDURE — 94760 N-INVAS EAR/PLS OXIMETRY 1: CPT

## 2023-01-01 PROCEDURE — 94002 VENT MGMT INPAT INIT DAY: CPT

## 2023-01-01 PROCEDURE — 6370000000 HC RX 637 (ALT 250 FOR IP): Performed by: PHYSICIAN ASSISTANT

## 2023-01-01 PROCEDURE — 94640 AIRWAY INHALATION TREATMENT: CPT

## 2023-01-01 PROCEDURE — 96365 THER/PROPH/DIAG IV INF INIT: CPT

## 2023-01-01 PROCEDURE — 86901 BLOOD TYPING SEROLOGIC RH(D): CPT

## 2023-01-01 PROCEDURE — 2580000003 HC RX 258

## 2023-01-01 PROCEDURE — 36556 INSERT NON-TUNNEL CV CATH: CPT

## 2023-01-01 PROCEDURE — 83010 ASSAY OF HAPTOGLOBIN QUANT: CPT

## 2023-01-01 RX ORDER — CHLORHEXIDINE GLUCONATE 0.12 MG/ML
15 RINSE ORAL 2 TIMES DAILY
Status: DISCONTINUED | OUTPATIENT
Start: 2023-01-01 | End: 2023-01-01 | Stop reason: HOSPADM

## 2023-01-01 RX ORDER — CALCIUM CHLORIDE, MAGNESIUM CHLORIDE, DEXTROSE MONOHYDRATE, LACTIC ACID, SODIUM CHLORIDE, SODIUM BICARBONATE AND POTASSIUM CHLORIDE 5.15; 2.03; 22; 5.4; 6.46; 3.09; .157 G/L; G/L; G/L; G/L; G/L; G/L; G/L
INJECTION INTRAVENOUS CONTINUOUS
Status: DISCONTINUED | OUTPATIENT
Start: 2023-01-01 | End: 2023-01-01

## 2023-01-01 RX ORDER — DEXTROSE MONOHYDRATE 25 G/50ML
25 INJECTION, SOLUTION INTRAVENOUS ONCE
Status: COMPLETED | OUTPATIENT
Start: 2023-01-01 | End: 2023-01-01

## 2023-01-01 RX ORDER — MAGNESIUM SULFATE 1 G/100ML
1000 INJECTION INTRAVENOUS PRN
Status: DISCONTINUED | OUTPATIENT
Start: 2023-01-01 | End: 2023-01-01 | Stop reason: HOSPADM

## 2023-01-01 RX ORDER — METHYLPREDNISOLONE SODIUM SUCCINATE 40 MG/ML
20 INJECTION, POWDER, LYOPHILIZED, FOR SOLUTION INTRAMUSCULAR; INTRAVENOUS EVERY 12 HOURS
Status: DISCONTINUED | OUTPATIENT
Start: 2023-01-01 | End: 2023-01-01 | Stop reason: HOSPADM

## 2023-01-01 RX ORDER — DOBUTAMINE HYDROCHLORIDE 200 MG/100ML
2.5-1 INJECTION INTRAVENOUS CONTINUOUS
Status: DISCONTINUED | OUTPATIENT
Start: 2023-01-01 | End: 2023-01-01 | Stop reason: HOSPADM

## 2023-01-01 RX ORDER — ONDANSETRON 2 MG/ML
4 INJECTION INTRAMUSCULAR; INTRAVENOUS EVERY 6 HOURS PRN
Status: DISCONTINUED | OUTPATIENT
Start: 2023-01-01 | End: 2023-01-01 | Stop reason: HOSPADM

## 2023-01-01 RX ORDER — EPINEPHRINE IN SOD CHLOR,ISO 1 MG/10 ML
SYRINGE (ML) INTRAVENOUS DAILY PRN
Status: COMPLETED | OUTPATIENT
Start: 2023-01-01 | End: 2023-01-01

## 2023-01-01 RX ORDER — ACETAMINOPHEN 650 MG/1
650 SUPPOSITORY RECTAL EVERY 6 HOURS PRN
Status: DISCONTINUED | OUTPATIENT
Start: 2023-01-01 | End: 2023-01-01

## 2023-01-01 RX ORDER — 0.9 % SODIUM CHLORIDE 0.9 %
1500 INTRAVENOUS SOLUTION INTRAVENOUS ONCE
Status: COMPLETED | OUTPATIENT
Start: 2023-01-01 | End: 2023-01-01

## 2023-01-01 RX ORDER — ACETAMINOPHEN 650 MG/1
650 SUPPOSITORY RECTAL ONCE
Status: COMPLETED | OUTPATIENT
Start: 2023-01-01 | End: 2023-01-01

## 2023-01-01 RX ORDER — 0.9 % SODIUM CHLORIDE 0.9 %
2000 INTRAVENOUS SOLUTION INTRAVENOUS ONCE
Status: COMPLETED | OUTPATIENT
Start: 2023-01-01 | End: 2023-01-01

## 2023-01-01 RX ORDER — INSULIN LISPRO 100 [IU]/ML
0-16 INJECTION, SOLUTION INTRAVENOUS; SUBCUTANEOUS EVERY 4 HOURS
Status: DISCONTINUED | OUTPATIENT
Start: 2023-01-01 | End: 2023-01-01

## 2023-01-01 RX ORDER — NOREPINEPHRINE BITARTRATE 0.06 MG/ML
1-100 INJECTION, SOLUTION INTRAVENOUS CONTINUOUS
Status: DISCONTINUED | OUTPATIENT
Start: 2023-01-01 | End: 2023-01-01 | Stop reason: HOSPADM

## 2023-01-01 RX ORDER — INSULIN LISPRO 100 [IU]/ML
0-4 INJECTION, SOLUTION INTRAVENOUS; SUBCUTANEOUS NIGHTLY
Status: DISCONTINUED | OUTPATIENT
Start: 2023-01-01 | End: 2023-01-01

## 2023-01-01 RX ORDER — SODIUM CHLORIDE 0.9 % (FLUSH) 0.9 %
5-40 SYRINGE (ML) INJECTION PRN
Status: DISCONTINUED | OUTPATIENT
Start: 2023-01-01 | End: 2023-01-01 | Stop reason: HOSPADM

## 2023-01-01 RX ORDER — CALCIUM GLUCONATE 20 MG/ML
2000 INJECTION, SOLUTION INTRAVENOUS ONCE
Status: COMPLETED | OUTPATIENT
Start: 2023-01-01 | End: 2023-01-01

## 2023-01-01 RX ORDER — ACETAMINOPHEN 325 MG/1
650 TABLET ORAL EVERY 6 HOURS PRN
Status: DISCONTINUED | OUTPATIENT
Start: 2023-01-01 | End: 2023-01-01

## 2023-01-01 RX ORDER — SODIUM CHLORIDE 0.9 % (FLUSH) 0.9 %
5-40 SYRINGE (ML) INJECTION EVERY 12 HOURS SCHEDULED
Status: DISCONTINUED | OUTPATIENT
Start: 2023-01-01 | End: 2023-01-01 | Stop reason: HOSPADM

## 2023-01-01 RX ORDER — SODIUM CHLORIDE 9 MG/ML
INJECTION, SOLUTION INTRAVENOUS PRN
Status: DISCONTINUED | OUTPATIENT
Start: 2023-01-01 | End: 2023-01-01 | Stop reason: HOSPADM

## 2023-01-01 RX ORDER — NALOXONE HYDROCHLORIDE 0.4 MG/ML
0.4 INJECTION, SOLUTION INTRAMUSCULAR; INTRAVENOUS; SUBCUTANEOUS ONCE
Status: DISCONTINUED | OUTPATIENT
Start: 2023-01-01 | End: 2023-01-01 | Stop reason: HOSPADM

## 2023-01-01 RX ORDER — LIDOCAINE HYDROCHLORIDE 10 MG/ML
INJECTION, SOLUTION EPIDURAL; INFILTRATION; INTRACAUDAL; PERINEURAL
Status: COMPLETED
Start: 2023-01-01 | End: 2023-01-01

## 2023-01-01 RX ORDER — 0.9 % SODIUM CHLORIDE 0.9 %
1000 INTRAVENOUS SOLUTION INTRAVENOUS ONCE
Status: COMPLETED | OUTPATIENT
Start: 2023-01-01 | End: 2023-01-01

## 2023-01-01 RX ORDER — DEXTROSE MONOHYDRATE 25 G/50ML
INJECTION, SOLUTION INTRAVENOUS
Status: COMPLETED
Start: 2023-01-01 | End: 2023-01-01

## 2023-01-01 RX ORDER — NOREPINEPHRINE BITARTRATE 0.06 MG/ML
1-100 INJECTION, SOLUTION INTRAVENOUS CONTINUOUS
Status: DISCONTINUED | OUTPATIENT
Start: 2023-01-01 | End: 2023-01-01

## 2023-01-01 RX ORDER — DEXTROSE MONOHYDRATE 25 G/50ML
25 INJECTION, SOLUTION INTRAVENOUS PRN
Status: DISCONTINUED | OUTPATIENT
Start: 2023-01-01 | End: 2023-01-01 | Stop reason: HOSPADM

## 2023-01-01 RX ORDER — SODIUM CHLORIDE 9 MG/ML
INJECTION, SOLUTION INTRAVENOUS PRN
Status: DISCONTINUED | OUTPATIENT
Start: 2023-01-01 | End: 2023-01-01

## 2023-01-01 RX ORDER — DOBUTAMINE HYDROCHLORIDE 200 MG/100ML
2.5-1 INJECTION INTRAVENOUS CONTINUOUS
Status: DISCONTINUED | OUTPATIENT
Start: 2023-01-01 | End: 2023-01-01

## 2023-01-01 RX ORDER — SODIUM CHLORIDE 9 MG/ML
25 INJECTION, SOLUTION INTRAVENOUS PRN
Status: DISCONTINUED | OUTPATIENT
Start: 2023-01-01 | End: 2023-01-01 | Stop reason: HOSPADM

## 2023-01-01 RX ORDER — METRONIDAZOLE 500 MG/100ML
500 INJECTION, SOLUTION INTRAVENOUS EVERY 8 HOURS
Status: DISCONTINUED | OUTPATIENT
Start: 2023-01-01 | End: 2023-01-01

## 2023-01-01 RX ORDER — METRONIDAZOLE 500 MG/100ML
500 INJECTION, SOLUTION INTRAVENOUS ONCE
Status: COMPLETED | OUTPATIENT
Start: 2023-01-01 | End: 2023-01-01

## 2023-01-01 RX ORDER — CALCIUM GLUCONATE 20 MG/ML
2000 INJECTION, SOLUTION INTRAVENOUS PRN
Status: DISCONTINUED | OUTPATIENT
Start: 2023-01-01 | End: 2023-01-01 | Stop reason: HOSPADM

## 2023-01-01 RX ORDER — POTASSIUM CHLORIDE 29.8 MG/ML
20 INJECTION INTRAVENOUS PRN
Status: DISCONTINUED | OUTPATIENT
Start: 2023-01-01 | End: 2023-01-01 | Stop reason: HOSPADM

## 2023-01-01 RX ORDER — ALBUMIN (HUMAN) 12.5 G/50ML
50 SOLUTION INTRAVENOUS ONCE
Status: DISCONTINUED | OUTPATIENT
Start: 2023-01-01 | End: 2023-01-01 | Stop reason: HOSPADM

## 2023-01-01 RX ORDER — LIDOCAINE HYDROCHLORIDE 10 MG/ML
5 INJECTION, SOLUTION EPIDURAL; INFILTRATION; INTRACAUDAL; PERINEURAL ONCE
Status: DISCONTINUED | OUTPATIENT
Start: 2023-01-01 | End: 2023-01-01 | Stop reason: HOSPADM

## 2023-01-01 RX ORDER — DEXTROSE MONOHYDRATE 100 MG/ML
INJECTION, SOLUTION INTRAVENOUS CONTINUOUS PRN
Status: DISCONTINUED | OUTPATIENT
Start: 2023-01-01 | End: 2023-01-01 | Stop reason: SDUPTHER

## 2023-01-01 RX ORDER — CALCIUM GLUCONATE 94 MG/ML
1000 INJECTION, SOLUTION INTRAVENOUS ONCE
Status: DISCONTINUED | OUTPATIENT
Start: 2023-01-01 | End: 2023-01-01 | Stop reason: HOSPADM

## 2023-01-01 RX ORDER — DEXTROSE MONOHYDRATE 100 MG/ML
INJECTION, SOLUTION INTRAVENOUS CONTINUOUS PRN
Status: DISCONTINUED | OUTPATIENT
Start: 2023-01-01 | End: 2023-01-01 | Stop reason: HOSPADM

## 2023-01-01 RX ORDER — CALCIUM GLUCONATE 20 MG/ML
1000 INJECTION, SOLUTION INTRAVENOUS PRN
Status: DISCONTINUED | OUTPATIENT
Start: 2023-01-01 | End: 2023-01-01 | Stop reason: HOSPADM

## 2023-01-01 RX ORDER — INSULIN LISPRO 100 [IU]/ML
0-16 INJECTION, SOLUTION INTRAVENOUS; SUBCUTANEOUS
Status: DISCONTINUED | OUTPATIENT
Start: 2023-01-01 | End: 2023-01-01

## 2023-01-01 RX ADMIN — SODIUM BICARBONATE 50 MEQ: 84 INJECTION INTRAVENOUS at 04:05

## 2023-01-01 RX ADMIN — SODIUM BICARBONATE 50 MEQ: 84 INJECTION INTRAVENOUS at 11:43

## 2023-01-01 RX ADMIN — PHENYLEPHRINE HYDROCHLORIDE 300 MCG/MIN: 10 INJECTION INTRAVENOUS at 03:14

## 2023-01-01 RX ADMIN — DEXTROSE MONOHYDRATE 25 G: 25 INJECTION, SOLUTION INTRAVENOUS at 22:14

## 2023-01-01 RX ADMIN — Medication 70 MCG/MIN: at 08:29

## 2023-01-01 RX ADMIN — SODIUM BICARBONATE: 84 INJECTION, SOLUTION INTRAVENOUS at 06:58

## 2023-01-01 RX ADMIN — Medication 100 MCG/MIN: at 03:42

## 2023-01-01 RX ADMIN — Medication: at 14:43

## 2023-01-01 RX ADMIN — SODIUM BICARBONATE 50 MEQ: 84 INJECTION, SOLUTION INTRAVENOUS at 04:06

## 2023-01-01 RX ADMIN — EPINEPHRINE 1 MG: 0.1 INJECTION INTRACARDIAC; INTRAVENOUS at 18:43

## 2023-01-01 RX ADMIN — SODIUM BICARBONATE 50 MEQ: 84 INJECTION INTRAVENOUS at 02:18

## 2023-01-01 RX ADMIN — 0.12% CHLORHEXIDINE GLUCONATE 15 ML: 1.2 RINSE ORAL at 21:01

## 2023-01-01 RX ADMIN — EPINEPHRINE 1 MCG/MIN: 1 INJECTION PARENTERAL at 16:53

## 2023-01-01 RX ADMIN — SODIUM BICARBONATE: 84 INJECTION, SOLUTION INTRAVENOUS at 05:38

## 2023-01-01 RX ADMIN — Medication 70 MCG/MIN: at 06:42

## 2023-01-01 RX ADMIN — EPINEPHRINE 20 MCG/MIN: 1 INJECTION PARENTERAL at 05:40

## 2023-01-01 RX ADMIN — 0.12% CHLORHEXIDINE GLUCONATE 15 ML: 1.2 RINSE ORAL at 08:44

## 2023-01-01 RX ADMIN — Medication 65 MCG/MIN: at 15:22

## 2023-01-01 RX ADMIN — CALCIUM CHLORIDE INJECTION 2000 MG: 100 INJECTION, SOLUTION INTRAVENOUS at 06:21

## 2023-01-01 RX ADMIN — DOBUTAMINE HYDROCHLORIDE 10 MCG/KG/MIN: 200 INJECTION INTRAVENOUS at 03:58

## 2023-01-01 RX ADMIN — DEXTROSE MONOHYDRATE: 25 INJECTION, SOLUTION INTRAVENOUS at 05:09

## 2023-01-01 RX ADMIN — HYDROCORTISONE SODIUM SUCCINATE 100 MG: 100 INJECTION, POWDER, FOR SOLUTION INTRAMUSCULAR; INTRAVENOUS at 01:51

## 2023-01-01 RX ADMIN — PANTOPRAZOLE SODIUM 8 MG/HR: 40 INJECTION, POWDER, FOR SOLUTION INTRAVENOUS at 12:15

## 2023-01-01 RX ADMIN — CEFEPIME 2000 MG: 2 INJECTION, POWDER, FOR SOLUTION INTRAVENOUS at 08:40

## 2023-01-01 RX ADMIN — Medication 100 MCG/MIN: at 00:04

## 2023-01-01 RX ADMIN — CALCIUM GLUCONATE 3000 MG: 98 INJECTION, SOLUTION INTRAVENOUS at 12:24

## 2023-01-01 RX ADMIN — DEXTROSE MONOHYDRATE 25 G: 25 INJECTION, SOLUTION INTRAVENOUS at 00:13

## 2023-01-01 RX ADMIN — PHENYLEPHRINE HYDROCHLORIDE 300 MCG/MIN: 10 INJECTION INTRAVENOUS at 00:42

## 2023-01-01 RX ADMIN — PHENYLEPHRINE HYDROCHLORIDE 300 MCG/MIN: 10 INJECTION INTRAVENOUS at 05:47

## 2023-01-01 RX ADMIN — EPINEPHRINE 20 MCG/MIN: 1 INJECTION PARENTERAL at 20:07

## 2023-01-01 RX ADMIN — CALCIUM GLUCONATE 2000 MG: 20 INJECTION, SOLUTION INTRAVENOUS at 02:34

## 2023-01-01 RX ADMIN — SODIUM BICARBONATE: 84 INJECTION, SOLUTION INTRAVENOUS at 05:41

## 2023-01-01 RX ADMIN — CALCIUM GLUCONATE 2000 MG: 20 INJECTION, SOLUTION INTRAVENOUS at 22:01

## 2023-01-01 RX ADMIN — INSULIN LISPRO 16 UNITS: 100 INJECTION, SOLUTION INTRAVENOUS; SUBCUTANEOUS at 15:39

## 2023-01-01 RX ADMIN — PANTOPRAZOLE SODIUM 80 MG: 40 INJECTION, POWDER, FOR SOLUTION INTRAVENOUS at 01:20

## 2023-01-01 RX ADMIN — CALCIUM CHLORIDE, MAGNESIUM CHLORIDE, DEXTROSE MONOHYDRATE, LACTIC ACID, SODIUM CHLORIDE, SODIUM BICARBONATE AND POTASSIUM CHLORIDE: 5.15; 2.03; 22; 5.4; 6.46; 3.09; .157 INJECTION INTRAVENOUS at 07:50

## 2023-01-01 RX ADMIN — PHENYLEPHRINE HYDROCHLORIDE 300 MCG/MIN: 10 INJECTION INTRAVENOUS at 03:37

## 2023-01-01 RX ADMIN — SODIUM CHLORIDE, PRESERVATIVE FREE 10 ML: 5 INJECTION INTRAVENOUS at 20:35

## 2023-01-01 RX ADMIN — EPINEPHRINE 12 MCG/MIN: 1 INJECTION INTRAMUSCULAR; INTRAVENOUS; SUBCUTANEOUS at 06:44

## 2023-01-01 RX ADMIN — LIDOCAINE HYDROCHLORIDE: 10 INJECTION, SOLUTION EPIDURAL; INFILTRATION; INTRACAUDAL; PERINEURAL at 03:22

## 2023-01-01 RX ADMIN — Medication 70 MCG/MIN: at 10:55

## 2023-01-01 RX ADMIN — Medication 100 MCG/MIN: at 20:53

## 2023-01-01 RX ADMIN — Medication: at 21:56

## 2023-01-01 RX ADMIN — METRONIDAZOLE 250 MG: 500 INJECTION, SOLUTION INTRAVENOUS at 22:02

## 2023-01-01 RX ADMIN — METHYLPREDNISOLONE SODIUM SUCCINATE 20 MG: 40 INJECTION INTRAMUSCULAR; INTRAVENOUS at 21:01

## 2023-01-01 RX ADMIN — Medication: at 16:47

## 2023-01-01 RX ADMIN — METRONIDAZOLE 500 MG: 500 INJECTION, SOLUTION INTRAVENOUS at 06:13

## 2023-01-01 RX ADMIN — Medication: at 01:35

## 2023-01-01 RX ADMIN — CALCIUM CHLORIDE, MAGNESIUM CHLORIDE, DEXTROSE MONOHYDRATE, LACTIC ACID, SODIUM CHLORIDE, SODIUM BICARBONATE AND POTASSIUM CHLORIDE: 5.15; 2.03; 22; 5.4; 6.46; 3.09; .157 INJECTION INTRAVENOUS at 01:28

## 2023-01-01 RX ADMIN — PANTOPRAZOLE SODIUM 8 MG/HR: 40 INJECTION, POWDER, FOR SOLUTION INTRAVENOUS at 00:22

## 2023-01-01 RX ADMIN — SODIUM BICARBONATE 50 MEQ: 84 INJECTION INTRAVENOUS at 05:39

## 2023-01-01 RX ADMIN — ACETAMINOPHEN 650 MG: 650 SUPPOSITORY RECTAL at 21:47

## 2023-01-01 RX ADMIN — Medication: at 21:27

## 2023-01-01 RX ADMIN — METRONIDAZOLE 250 MG: 500 INJECTION, SOLUTION INTRAVENOUS at 06:20

## 2023-01-01 RX ADMIN — CEFEPIME 2000 MG: 2 INJECTION, POWDER, FOR SOLUTION INTRAVENOUS at 22:11

## 2023-01-01 RX ADMIN — SODIUM CHLORIDE 2000 ML: 9 INJECTION, SOLUTION INTRAVENOUS at 19:58

## 2023-01-01 RX ADMIN — PHENYLEPHRINE HYDROCHLORIDE 100 MCG/MIN: 10 INJECTION INTRAVENOUS at 13:44

## 2023-01-01 RX ADMIN — PHENYLEPHRINE HYDROCHLORIDE 300 MCG/MIN: 10 INJECTION INTRAVENOUS at 18:26

## 2023-01-01 RX ADMIN — VANCOMYCIN HYDROCHLORIDE 1750 MG: 1 INJECTION, POWDER, LYOPHILIZED, FOR SOLUTION INTRAVENOUS at 09:15

## 2023-01-01 RX ADMIN — DEXTROSE MONOHYDRATE 250 ML: 100 INJECTION, SOLUTION INTRAVENOUS at 04:34

## 2023-01-01 RX ADMIN — Medication: at 18:18

## 2023-01-01 RX ADMIN — VASOPRESSIN 0.04 UNITS/MIN: 20 INJECTION INTRAVENOUS at 15:35

## 2023-01-01 RX ADMIN — CEFEPIME 2000 MG: 2 INJECTION, POWDER, FOR SOLUTION INTRAVENOUS at 15:59

## 2023-01-01 RX ADMIN — SODIUM BICARBONATE: 84 INJECTION, SOLUTION INTRAVENOUS at 21:22

## 2023-01-01 RX ADMIN — EPINEPHRINE 1 MG: 0.1 INJECTION INTRACARDIAC; INTRAVENOUS at 18:47

## 2023-01-01 RX ADMIN — CALCIUM CHLORIDE, MAGNESIUM CHLORIDE, DEXTROSE MONOHYDRATE, LACTIC ACID, SODIUM CHLORIDE, SODIUM BICARBONATE AND POTASSIUM CHLORIDE: 5.15; 2.03; 22; 5.4; 6.46; 3.09; .157 INJECTION INTRAVENOUS at 08:00

## 2023-01-01 RX ADMIN — CALCIUM CHLORIDE, MAGNESIUM CHLORIDE, DEXTROSE MONOHYDRATE, LACTIC ACID, SODIUM CHLORIDE, SODIUM BICARBONATE AND POTASSIUM CHLORIDE: 5.15; 2.03; 22; 5.4; 6.46; 3.09; .157 INJECTION INTRAVENOUS at 12:12

## 2023-01-01 RX ADMIN — Medication: at 20:30

## 2023-01-01 RX ADMIN — EPINEPHRINE 11 MCG/MIN: 1 INJECTION PARENTERAL at 08:27

## 2023-01-01 RX ADMIN — CALCIUM GLUCONATE 2000 MG: 20 INJECTION, SOLUTION INTRAVENOUS at 16:49

## 2023-01-01 RX ADMIN — PHENYLEPHRINE HYDROCHLORIDE 300 MCG/MIN: 10 INJECTION INTRAVENOUS at 21:33

## 2023-01-01 RX ADMIN — SODIUM BICARBONATE 50 MEQ: 84 INJECTION INTRAVENOUS at 04:03

## 2023-01-01 RX ADMIN — CEFEPIME 2000 MG: 2 INJECTION, POWDER, FOR SOLUTION INTRAVENOUS at 00:06

## 2023-01-01 RX ADMIN — CALCIUM CHLORIDE, MAGNESIUM CHLORIDE, DEXTROSE MONOHYDRATE, LACTIC ACID, SODIUM CHLORIDE, SODIUM BICARBONATE AND POTASSIUM CHLORIDE: 5.15; 2.03; 22; 5.4; 6.46; 3.09; .157 INJECTION INTRAVENOUS at 11:52

## 2023-01-01 RX ADMIN — PHENYLEPHRINE HYDROCHLORIDE 30 MCG/MIN: 10 INJECTION INTRAVENOUS at 21:44

## 2023-01-01 RX ADMIN — SODIUM CHLORIDE 1000 ML: 9 INJECTION, SOLUTION INTRAVENOUS at 04:05

## 2023-01-01 RX ADMIN — EPINEPHRINE 1 MCG/MIN: 1 INJECTION INTRAMUSCULAR; INTRAVENOUS; SUBCUTANEOUS at 00:22

## 2023-01-01 RX ADMIN — GLUCAGON HYDROCHLORIDE 1 MG: KIT at 22:16

## 2023-01-01 RX ADMIN — ALBUTEROL SULFATE 10 MG: 2.5 SOLUTION RESPIRATORY (INHALATION) at 23:36

## 2023-01-01 RX ADMIN — SODIUM CHLORIDE 1500 ML: 9 INJECTION, SOLUTION INTRAVENOUS at 22:18

## 2023-01-01 RX ADMIN — Medication: at 02:36

## 2023-01-01 RX ADMIN — PANTOPRAZOLE SODIUM 8 MG/HR: 40 INJECTION, POWDER, FOR SOLUTION INTRAVENOUS at 01:49

## 2023-01-01 RX ADMIN — Medication 5 MCG/MIN: at 23:03

## 2023-01-01 RX ADMIN — EPINEPHRINE 1 MG: 0.1 INJECTION INTRACARDIAC; INTRAVENOUS at 18:40

## 2023-01-01 RX ADMIN — Medication: at 22:22

## 2023-01-01 RX ADMIN — METHYLPREDNISOLONE SODIUM SUCCINATE 20 MG: 40 INJECTION INTRAMUSCULAR; INTRAVENOUS at 08:46

## 2023-01-01 RX ADMIN — Medication 100 MCG/MIN: at 05:53

## 2023-01-01 RX ADMIN — METRONIDAZOLE 250 MG: 500 INJECTION, SOLUTION INTRAVENOUS at 15:18

## 2023-01-01 RX ADMIN — SODIUM BICARBONATE: 84 INJECTION, SOLUTION INTRAVENOUS at 16:46

## 2023-01-01 RX ADMIN — VASOPRESSIN 0.04 UNITS/MIN: 20 INJECTION INTRAVENOUS at 09:27

## 2023-01-01 RX ADMIN — CALCIUM GLUCONATE 2000 MG: 20 INJECTION, SOLUTION INTRAVENOUS at 01:14

## 2023-01-01 RX ADMIN — CALCIUM CHLORIDE, MAGNESIUM CHLORIDE, DEXTROSE MONOHYDRATE, LACTIC ACID, SODIUM CHLORIDE, SODIUM BICARBONATE AND POTASSIUM CHLORIDE: 5.15; 2.03; 22; 5.4; 6.46; 3.09; .157 INJECTION INTRAVENOUS at 09:49

## 2023-01-01 RX ADMIN — VASOPRESSIN 0.03 UNITS/MIN: 20 INJECTION INTRAVENOUS at 00:44

## 2023-01-01 RX ADMIN — SODIUM BICARBONATE: 84 INJECTION, SOLUTION INTRAVENOUS at 09:08

## 2023-01-01 RX ADMIN — DOBUTAMINE HYDROCHLORIDE 2.5 MCG/KG/MIN: 200 INJECTION INTRAVENOUS at 20:30

## 2023-01-01 RX ADMIN — DOBUTAMINE HYDROCHLORIDE 2.5 MCG/KG/MIN: 200 INJECTION INTRAVENOUS at 04:44

## 2023-01-01 RX ADMIN — VASOPRESSIN 0.04 UNITS/MIN: 20 INJECTION INTRAVENOUS at 07:33

## 2023-01-01 RX ADMIN — Medication: at 23:21

## 2023-01-01 RX ADMIN — PHENYLEPHRINE HYDROCHLORIDE 275 MCG/MIN: 10 INJECTION INTRAVENOUS at 09:03

## 2023-01-01 RX ADMIN — EPINEPHRINE 1 MG: 0.1 INJECTION INTRACARDIAC; INTRAVENOUS at 18:52

## 2023-01-01 RX ADMIN — PHENYLEPHRINE HYDROCHLORIDE 300 MCG/MIN: 10 INJECTION INTRAVENOUS at 06:42

## 2023-01-01 RX ADMIN — Medication: at 23:12

## 2023-01-01 RX ADMIN — PHENYLEPHRINE HYDROCHLORIDE 275 MCG/MIN: 10 INJECTION INTRAVENOUS at 08:30

## 2023-01-01 RX ADMIN — SODIUM BICARBONATE 50 MEQ: 84 INJECTION INTRAVENOUS at 02:17

## 2023-01-01 RX ADMIN — SODIUM BICARBONATE 50 MEQ: 84 INJECTION, SOLUTION INTRAVENOUS at 04:09

## 2023-01-01 RX ADMIN — Medication: at 04:19

## 2023-01-01 RX ADMIN — SODIUM BICARBONATE: 84 INJECTION, SOLUTION INTRAVENOUS at 04:45

## 2023-01-01 ASSESSMENT — PAIN SCALES - GENERAL
PAINLEVEL_OUTOF10: 0

## 2023-01-01 ASSESSMENT — PULMONARY FUNCTION TESTS
PIF_VALUE: 37
PIF_VALUE: 30
PIF_VALUE: 33
PIF_VALUE: 32
PIF_VALUE: 32
PIF_VALUE: 30
PIF_VALUE: 30
PIF_VALUE: 37

## 2023-01-01 ASSESSMENT — ENCOUNTER SYMPTOMS: VOMITING: 1

## 2023-01-10 ENCOUNTER — HOSPITAL ENCOUNTER (OUTPATIENT)
Dept: GENERAL RADIOLOGY | Age: 38
Discharge: HOME | End: 2023-01-10
Payer: COMMERCIAL

## 2023-01-10 ENCOUNTER — HOSPITAL ENCOUNTER (OUTPATIENT)
Age: 38
Discharge: HOME | End: 2023-01-10
Payer: COMMERCIAL

## 2023-01-10 DIAGNOSIS — M54.9 BACK PAIN, UNSPECIFIED BACK LOCATION, UNSPECIFIED BACK PAIN LATERALITY, UNSPECIFIED CHRONICITY: ICD-10-CM

## 2023-01-10 PROCEDURE — 73521 X-RAY EXAM HIPS BI 2 VIEWS: CPT

## 2023-01-10 PROCEDURE — 72100 X-RAY EXAM L-S SPINE 2/3 VWS: CPT

## 2023-02-18 DIAGNOSIS — E78.00 HIGH CHOLESTEROL: ICD-10-CM

## 2023-02-18 DIAGNOSIS — F34.1 DYSTHYMIC DISORDER: ICD-10-CM

## 2023-02-18 DIAGNOSIS — F39 MOOD DISORDER (HCC): ICD-10-CM

## 2023-02-18 DIAGNOSIS — R63.5 WEIGHT GAIN: ICD-10-CM

## 2023-02-18 DIAGNOSIS — F11.23 OPIOID DEPENDENCE WITH WITHDRAWAL (HCC): ICD-10-CM

## 2023-02-18 LAB
A/G RATIO: 1.8 (ref 1.1–2.2)
ALBUMIN SERPL-MCNC: 4.4 G/DL (ref 3.4–5)
ALP BLD-CCNC: 76 U/L (ref 40–129)
ALT SERPL-CCNC: 8 U/L (ref 10–40)
ANION GAP SERPL CALCULATED.3IONS-SCNC: 12 MMOL/L (ref 3–16)
AST SERPL-CCNC: 12 U/L (ref 15–37)
BASOPHILS ABSOLUTE: 0.2 K/UL (ref 0–0.2)
BASOPHILS RELATIVE PERCENT: 1 %
BILIRUB SERPL-MCNC: <0.2 MG/DL (ref 0–1)
BUN BLDV-MCNC: 13 MG/DL (ref 7–20)
CALCIUM SERPL-MCNC: 9.4 MG/DL (ref 8.3–10.6)
CHLORIDE BLD-SCNC: 102 MMOL/L (ref 99–110)
CHOLESTEROL, TOTAL: 304 MG/DL (ref 0–199)
CO2: 24 MMOL/L (ref 21–32)
CREAT SERPL-MCNC: 0.7 MG/DL (ref 0.6–1.1)
EOSINOPHILS ABSOLUTE: 0.3 K/UL (ref 0–0.6)
EOSINOPHILS RELATIVE PERCENT: 1.7 %
ESTIMATED AVERAGE GLUCOSE: 105.4 MG/DL
FOLATE: >20 NG/ML (ref 4.78–24.2)
GFR SERPL CREATININE-BSD FRML MDRD: >60 ML/MIN/{1.73_M2}
GLUCOSE BLD-MCNC: 108 MG/DL (ref 70–99)
HBA1C MFR BLD: 5.3 %
HCT VFR BLD CALC: 40.8 % (ref 36–48)
HDLC SERPL-MCNC: 57 MG/DL (ref 40–60)
HEMOGLOBIN: 14 G/DL (ref 12–16)
HEPATITIS B SURFACE ANTIGEN INTERPRETATION: NORMAL
HEPATITIS C ANTIBODY INTERPRETATION: NORMAL
HIV AG/AB: NORMAL
HIV ANTIGEN: NORMAL
HIV-1 ANTIBODY: NORMAL
HIV-2 AB: NORMAL
LDL CHOLESTEROL CALCULATED: 206 MG/DL
LYMPHOCYTES ABSOLUTE: 3.9 K/UL (ref 1–5.1)
LYMPHOCYTES RELATIVE PERCENT: 24.7 %
MCH RBC QN AUTO: 30.7 PG (ref 26–34)
MCHC RBC AUTO-ENTMCNC: 34.2 G/DL (ref 31–36)
MCV RBC AUTO: 89.6 FL (ref 80–100)
MONOCYTES ABSOLUTE: 0.7 K/UL (ref 0–1.3)
MONOCYTES RELATIVE PERCENT: 4.3 %
NEUTROPHILS ABSOLUTE: 10.8 K/UL (ref 1.7–7.7)
NEUTROPHILS RELATIVE PERCENT: 68.3 %
PDW BLD-RTO: 14.2 % (ref 12.4–15.4)
PLATELET # BLD: 427 K/UL (ref 135–450)
PMV BLD AUTO: 9 FL (ref 5–10.5)
POTASSIUM SERPL-SCNC: 5.4 MMOL/L (ref 3.5–5.1)
RBC # BLD: 4.55 M/UL (ref 4–5.2)
SEDIMENTATION RATE, ERYTHROCYTE: 13 MM/HR (ref 0–20)
SODIUM BLD-SCNC: 138 MMOL/L (ref 136–145)
TOTAL PROTEIN: 6.9 G/DL (ref 6.4–8.2)
TOTAL SYPHILLIS IGG/IGM: NORMAL
TRIGL SERPL-MCNC: 204 MG/DL (ref 0–150)
TSH REFLEX FT4: 1.73 UIU/ML (ref 0.27–4.2)
VITAMIN B-12: 510 PG/ML (ref 211–911)
VLDLC SERPL CALC-MCNC: 41 MG/DL
WBC # BLD: 15.7 K/UL (ref 4–11)

## 2023-02-20 RX ORDER — ATORVASTATIN CALCIUM 20 MG/1
20 TABLET, FILM COATED ORAL DAILY
Qty: 30 TABLET | Refills: 3 | Status: SHIPPED | OUTPATIENT
Start: 2023-02-20

## 2023-02-21 LAB — HSV 2 GLYCOPROTEIN G AB IGG: 0.13 IV

## 2023-06-01 PROBLEM — N17.9 AKI (ACUTE KIDNEY INJURY) (HCC): Status: ACTIVE | Noted: 2023-01-01

## 2023-06-01 PROBLEM — R57.9 SHOCK (HCC): Status: ACTIVE | Noted: 2023-01-01

## 2023-06-01 PROBLEM — K72.90 LIVER FAILURE (HCC): Status: ACTIVE | Noted: 2023-01-01

## 2023-06-01 PROBLEM — K92.2 GI BLEED: Status: ACTIVE | Noted: 2023-01-01

## 2023-06-01 NOTE — ED PROVIDER NOTES
[facility]      Pt Name: Kelli Brewster  MRN: [id number]  [place] 1985  Date of evaluation: 6/1/2023  Provider: Saniya Grace MD  PCP: Zain Ho MD  Note Started: 7:01 PM EDT 6/1/23    ED Attending Attestation Note     CHIEF COMPLAINT       Chief Complaint   Patient presents with    Drug Overdose     EMERGENCY DEPARTMENT COURSE and DIFFERENTIAL DIAGNOSIS/MDM:      This patient was seen by the advance practice provider. In addition to the SALOM[facility]N I personally saw Kelli Brewster and performed a substantive portion of the visit including all aspects of the medical decision making. Briefly, this is a 30 y.o. female who presented to the ED via EMS from home. EMS states they were called to her house yesterday where she received narcan, reportedly she was partying hard, refused to come to hospital. They were called back again today and on arrival today she was unresponsive. She was given 4mg narcan with improvement. On arrival here she was agitated, yelling, kicking. She pulled out the IV which was placed. It was noted however she became less responsive and her breathing was becoming more agonal which is when I was called to the bedside. More narcan was ordered however after administration she began to vomit and then lost pulses, CPR was started and she was intubated. Please see procedure note below. After intubation she was tachycardic and hypotensive. Concern for aspiration pneumonia. Broad-spectrum antibiotics and IV fluid bolus ordered. Unfortunately she remained hypotensive and central line placed by SALOMÓN please see their note for details started on dobutamine as she remains hypotensive and tachycardic. No urine output from metcalf. Labs began to return with multiple abnormalities concerning for multiorgan failure and need for emergent dialysis.  Ordered medications for hyperkalemia and Nephrology consult placed, she was
17 (*)     Glucose 21 (*)     BUN 41 (*)     Creatinine 3.7 (*)     Est, Glom Filt Rate 16 (*)     Calcium 6.8 (*)     Total Protein 5.0 (*)     Albumin 2.9 (*)     Alkaline Phosphatase 182 (*)     ALT 1,707 (*)     AST 2,134 (*)     All other components within normal limits    Narrative:     [facility] tel. [phone],  Chemistry results called to and read back by Suresh Pollack RN, 06/01/2023  22:58, by [facility] results called to and read back by Suresh Pollack RN, 06/01/2023  22:58, by [facility] ACID - Abnormal; Notable for the following components:    Lactic Acid 6.8 (*)     All other components within normal limits    Narrative:     [facility] tel. [phone],  Chemistry results called to and read back by Suresh Pollack RN, 06/01/2023  22:59, by [id number] [employer] [facility], VENOUS - Abnormal; Notable for the following components:    pH, Marco 7.042 (*)     HCO3, Venous 13 (*)     All other components within normal limits    Narrative:     Previous specimen QNS and arrived uncapped.  Reordered as VBG instead of   ABG per request of Suresh Pollack RN  CALL  [address] tel. [phone],  Chemistry results called to and read back by Dima White RN, 06/01/2023  22:17, by [facility] - Abnormal; Notable for the following components:    Occult Blood Diagnostic   (*)     Value: Result: POSITIVE  Normal range: Negative      All other components within normal limits    Narrative:     ORDER#: [id number]                          ORDERED BY: MIKAL MONTILLA  SOURCE: Stool                              COLLECTED:  06/01/23 23:34  ANTIBIOTICS AT RADHA.:                      RECEIVED :  06/01/23 23:38   POCT GLUCOSE - Abnormal; Notable for the following components:    POC Glucose 28 (*)     All other components within normal limits   POCT GLUCOSE - Abnormal; Notable for the following components:    POC Glucose 179 (*)     All other components within normal limits   POCT GLUCOSE - Abnormal;

## 2023-06-02 PROBLEM — G93.1 ANOXIC ENCEPHALOPATHY (HCC): Status: ACTIVE | Noted: 2023-01-01

## 2023-06-02 PROBLEM — J18.9 PNEUMONIA: Status: ACTIVE | Noted: 2023-01-01

## 2023-06-02 PROBLEM — J96.02 ACUTE RESPIRATORY FAILURE WITH HYPOXIA AND HYPERCAPNIA (HCC): Status: ACTIVE | Noted: 2023-01-01

## 2023-06-02 PROBLEM — M62.82 RHABDOMYOLYSIS: Status: ACTIVE | Noted: 2023-01-01

## 2023-06-02 PROBLEM — I46.9 CARDIAC ARREST (HCC): Status: ACTIVE | Noted: 2023-01-01

## 2023-06-02 PROBLEM — J96.01 ACUTE RESPIRATORY FAILURE WITH HYPOXIA AND HYPERCAPNIA (HCC): Status: ACTIVE | Noted: 2023-01-01

## 2023-06-02 NOTE — ED NOTES
Upon arrival patient was kick and swinging at EMS while this RN was checking the patient in. Patient was transferred from the cot to the bed. Upon arrival to the room the patient was lethargic and not responding. Staff assist was requested.       Millicent Olivares RN  06/02/23 0000

## 2023-06-02 NOTE — PROCEDURES
[address] [place] [address]                          ELECTROENCEPHALOGRAM REPORT    PATIENT NAME: Juanis Quintanilla                    :        1985  MED REC NO:   [id number]                          ROOM:       2123  ACCOUNT NO:   [de-identified]                           ADMIT DATE: 2023  PROVIDER:     Hollie Bishop DO    DATE OF EE2023    REFERRING PROVIDER:  Bebeto Javier DO    REASON FOR STUDY:  Anoxia, status post cardiac arrest.    BRIEF HISTORY AND NEUROLOGIC FINDINGS:  The patient is a 51-year-old  female being evaluated for potential anoxic brain injury and associated  encephalopathy. MEDICATIONS:  The patient's centrally-acting medications listed include  Narcan. EEG FINDINGS:  This is a 20-channel digital EEG performed utilizing  bipolar and referential montages. The patient was unresponsive  throughout the recording. The background consisted of a very low  voltage, predominantly myogenic obscured background with what appears to  be some delta slowing superimposed in the right hemisphere. Due to  significant myogenic artifact, it was difficult to identify whether this  was of clinical significance or not. The left hemisphere appeared to be  severely suppressed or devoid of electrical activity, though again due  to myogenic artifact, some of the leads were obscured. There was no  obvious reactivity of the background. No wakefulness was identified. Photic stimulation was performed at various flash frequencies and did  not evoke any significant posterior driving response. Hyperventilation  exercise was not performed due to the patient's clinical history. A 1-channel EKG rhythm strip was reviewed and showed no obvious cardiac  dysrhythmias.     EEG DIAGNOSIS:  Severely abnormal EEG secondary to severe bihemispheric  suppression with what appears to be possible electrocerebral

## 2023-06-02 NOTE — PROCEDURES
Visit us at [facility]. com or call us at [address] [place] [address] TUNNELED DIALYSIS CATHETER      Date of procedure:  June 2, 2023    Consent:  Not obtained. Procedure performed on an emergent basis. No family available. Indication:   SCOTT/Severe hyperkalemia/Emergent dialysis    Procedure: The right IJV was located with U/S guidance. The area was prepped and draped in a sterile fashion. The skin overlying the right IJV was infiltrated with lidocaine. The right IJV was accessed from the first stick with U/S guidance. An 0.035 guidewire was then passed without difficulties. The tract was dilated with a 10 and 12 fr dilators followed by the insertion of a 16cm long/11.5 fr Mahurkar non-tunneled dialysis catheter. The 3 lumens of the catheter were tested with a 10mL syringe with good flush/flow. A follow up X-ray showed tip in the SVC with no PTX/DEVAN  The catheter was [facility] for use. No complications.     Brian Marsh MD

## 2023-06-02 NOTE — PROCEDURES
Indications:Emergency:  hemodynamic monitoring, pt in shock on 4 vasopressors and remains hypotensive, requiring CRRT, s/p Cardiopulmonary arrest.     Consent was not obtained d/t critical scenario. Hand washing prior to procedure    Sterile cap, gown, and gloves donned    Sterile drap placed    Site prepped w/ chloroprep and allowed to dry    Site rationale: pt very hypotensive, would be much more difficult to successfully place a radial.    20 cm LEFT Femoral  arterial line successfully placed via seldinger technique w/ US guideance w/ assistance of Dr. Shawn Mcpherson from Nephrology. He has also placed the RIGHT IJ VAS CATH. Arterial line with good flow. Site sutured in place. Connected to pressure tubing set-up. Line zero'd. Sterile dressing applied. Great wave form: b/p 84/38. ESBL: less than 3-5 ml.

## 2023-06-02 NOTE — H&P
V2.0  History and Physical      Name:  Mack Yang /Age/Sex: 1985  (30 y.o. female)   MRN & CSN:  [id number] & [id number] Encounter Date/Time: 2023 5:23 AM EDT   Location:  [id number] PCP: Beau Thrasher MD       Hospital Day: 2    Assessment and Plan:   Mack Yang is a 30 y.o. female with a pmh of as below who presents with Shock [facility])    [address] [place]    * (Principal) Shock ([facility] [address] 2023 Yes    GI bleed 2023 Yes    SCOTT (acute kidney injury) ([facility] [address] 2023 Yes    Liver failure ([facility] [address] 2023 Yes    Rhabdomyolysis 2023 Yes   Pneumonia    Plan:  Nephrology consulted, Vas-Cath placed, CRRT started when patient got to ICU  Critical care consult  Continue vasopressors and currently patient is on Levophed, Dean-Synephrine, vasopressin, epinephrine, dobutamine  Continue IV fluid hydration, patient started bicarbonate IV infusion  Patient was given upwards of almost 6 A of bicarb since being in ED  Continue boluses and given 1 dose of albumin  Start patient on Solu-Cortef 100 mg every 8  Patient started on vancomycin/cefepime/Flagyl for broad-spectrum coverage of sepsis and aspiration pneumonia  Repeat labs for CRRT and repeat CBC daily      Disposition:   Current Living situation: home    Diet Diet NPO   DVT Prophylaxis [] Lovenox, []  Heparin, [x] SCDs, [] Ambulation,  [] Eliquis, [] Xarelto, [] Coumadin   Code Status Full Code   Surrogate Decision Maker/ POA None listed     Personally reviewed Lab Studies and Imaging             History from:     electronic medical record    History of Present Illness:     Chief Complaint: altered mental status    Patient seen 2023, admitted to inpatient      Mack Yang is a 30 y.o. female with pmh of of asthma, myalgia, hypertension, depression, PTSD, multisubstance abuse who presents via EMS to the ED for altered mental status.   Patient arrived to the ED initially awake and somewhat agitated apparently

## 2023-06-02 NOTE — CONSULTS
Clinical Pharmacy Note  Vancomycin Consult    Pharmacy consult received for one-time dose of vancomycin in the Emergency Department per Marylee Coup, PA. Ht Readings from Last 1 Encounters:   04/20/23 5' 6\" (1.676 m)        Wt Readings from Last 1 Encounters:   04/20/23 224 lb (101.6 kg)         Assessment/Plan:  Vancomycin 1500 mg x 1 in ED. If Vancomycin is to continue on admission and pharmacy is to manage dosing, please re-consult with admission orders.       GAYATRI Dumont [facility]  6/1/2023 7:06 PM
Neurology Consult Note  Reason for Consult: unresponsive s/p cardiac arrest, anoxic brain injury? ?    Chief complaint: unable to obtain from the patient at this time    Dr Ang Noble MD asked me to see Basil Kaur in consultation for evaluation of unresponsive s/p cardiac arrest, anoxic brain injury? ? History of Present Illness:  Basil Kaur is a 30 y.o. female who presents with altered mental status. I obtained my information via chart review and discussion w/ the patient's RN. Per chart, the patient was vomiting blood Tuesday/Wednesday. It appears EMS was called Wednesday evening and she was administered narcan though did not come to the hospital.  By 1700 yesterday the patient apparently was unable to be aroused and EMS was called again. She was given narcan again. When she arrived here she was combative, kicking and swinging at EMS personnel. By the time she got in an ED room she was not responding. Resuscitation efforts ensued. It is unclear to me how long it was before a pulse was obtained. She was intubated. She has not been on sedation. Initially she was on 5 pressors. No brain imaging was obtained given her instability. Temperature 103.4F. Labs were significantly abnormal as below. Currently she is on 3 pressors and CRRT. She is unresponsive. There is a hx of substance abuse from what I was told.       Medical History:  Past Medical History:   Diagnosis Date    Anesthesia     admitted postop gallbladder for low heart rate    Arthritis     Asthma     Fibromyalgia     Generalized anxiety disorder     Hypertension     Insomnia     Major depressive disorder, recurrent episode, moderate ([facility] [address] 10/25/2010    Movement disorder     knee pain    Multiple substance abuse ([facility] [address]     PTSD (post-traumatic stress disorder)      Past Surgical History:   Procedure Laterality Date    CERVIX SURGERY      for dysplasia    CHOLECYSTECTOMY      LEEP      TONSILLECTOMY
bicarbonate  50 mEq IntraVENous Once    metroNIDAZOLE  500 mg IntraVENous Q8H    cefepime  1,000 mg IntraVENous Q12H    vancomycin (VANCOCIN) intermittent dosing (placeholder)   Other RX Placeholder       Continuous Infusions:   sodium chloride      dextrose      prismaSol BGK 2/3.5 2,500 mL/hr at 06/02/23 0128    prismaSol BGK 2/3.5 1,500 mL/hr at 06/02/23 0128    dialysis builder 1,000 mL/hr at 06/02/23 0538    sodium chloride      DOBUTamine 10 mcg/kg/min (06/02/23 0600)    sodium bicarbonate infusion 50 mL/hr at 06/02/23 0600    phenylephrine (KEYANA-SYNEPHRINE) 50 mg/250 mL infusion 300 mcg/min (06/02/23 0600)    norepinephrine 70 mcg/min (06/02/23 0642)    vasopressin (Septic Shock) infusion 0.04 Units/min (06/02/23 0600)    pantoprazole 8 mg/hr (06/02/23 0149)    EPINEPHrine (ADRENALIN) 5 mg in sodium chloride 0.9% 250 mL infusion 12 mcg/min (06/02/23 0644)       PRN Meds:  sodium chloride flush, sodium chloride, acetaminophen **OR** acetaminophen, glucose, dextrose bolus **OR** dextrose bolus, glucagon (rDNA), dextrose, ondansetron, potassium chloride, magnesium sulfate, sodium phosphate IVPB **OR** sodium phosphate IVPB **OR** sodium phosphate IVPB **OR** sodium phosphate IVPB, dextrose, sodium chloride    ALLERGIES:  Patient has No Known Allergies. REVIEW OF SYSTEMS:  Unable to obtain from the patient     PHYSICAL EXAM:  Blood pressure (!) 129/59, pulse 73, temperature 99.4 °F (37.4 °C), temperature source Axillary, resp. rate 24, weight 250 lb 10.6 oz (113.7 kg), SpO2 100 %, not currently breastfeeding.'  Gen: Comatose, no response to any stimuli  Eyes: Pupils non-reactive   ENT: No discharge. Pharynx with ETT and OG. Blood in nares   Neck: Trachea midline. No obvious mass. Resp: No accessory muscle use. Rhonchi. No cough reflex   CV: Tachycardic. No murmur or rub. GI: Non-tender. Non-distended. No hernia. BS present. Skin: Cool skin, some mottling   Lymph: No cervical LAD. No supraclavicular LAD.
hypoglycemia: [id number]    I spent 90min of critical care, reviewing the records, examining the patient, discussing the plan of care with the ICU team and writing orders.          Signed By: Haylee Pappas MD

## 2023-06-03 NOTE — SIGNIFICANT EVENT
Asystole on monitor at 739 am.      Since she was on 4 pressors, hypothermic and exhibiting signs of rigor mortis resuscitation was deemed futile. Her core body temp was 86 suggesting she had been  for some time.   Brain dead based on clinical exam this am.      There was nothing left to offer her as every organ exhibited signs of failure    Time of death 1 am on 6/3/23    DO Josselin Zapata [facility]  [phone]

## 2023-06-05 LAB
BACTERIA BLD CULT ORG #2: NORMAL
BACTERIA BLD CULT: NORMAL

## 2023-06-06 LAB
BASE EXCESS BLDA CALC-SCNC: -4 MMOL/L (ref -3–3)
CA-I BLD-SCNC: 0.86 MMOL/L (ref 1.12–1.32)
CO2 BLDA-SCNC: 21 MMOL/L
HCO3 BLDA-SCNC: 20.3 MMOL/L (ref 21–29)
LACTATE BLD-SCNC: >20 MMOL/L (ref 0.4–2)
PCO2 BLDA: 32.5 MM HG (ref 35–45)
PERFORMED ON: ABNORMAL
PH BLDA: 7.41 [PH] (ref 7.35–7.45)
PO2 BLDA: 60.1 MM HG (ref 75–108)
POC SAMPLE TYPE: ABNORMAL
SAO2 % BLDA: 91 % (ref 93–100)

## 2023-06-07 NOTE — DISCHARGE SUMMARY
Hospital Medicine Discharge Summary    Patient ID: Kris Graff      Patient's PCP: Maegan Lopez MD    Admit Date: 6/1/2023     Discharge Date: 6/3/2023      Admitting Physician: Fabiano Trejo DO     Discharge Physician: Enrique Alejandra MD     Discharge Diagnoses: Active Hospital Problems    Diagnosis     Rhabdomyolysis [M62.82]     Pneumonia [J18.9]     Acute respiratory failure with hypoxia and hypercapnia (HCC) [J96.01, J96.02]     Cardiac arrest ([address] [I46.9]     Anoxic encephalopathy (HCC) [G93.1]     Shock ([address] [R57.9]     GI bleed [K92.2]     SCOTT (acute kidney injury) ([address] [N17.9]     Liver failure ([address] [K72.90]        The patient was seen and examined on day of discharge and this discharge summary is in conjunction with any daily progress note from day of discharge. Hospital Course:     Kris Graff is a 30 y.o. female with pmh of of asthma, myalgia, hypertension, depression, PTSD, multisubstance abuse who presents via EMS to the ED for altered mental status. Patient arrived to the ED initially awake and somewhat agitated apparently she was initially seen by EMS earlier the previous night and was given Narcan because she was altered from some sort of substance abuse. EMS was called again today because she was not responding and she may have been found at her previous ex-'s domicile. She was somewhat agitated and aggressive with the nursing staff rotation of the bed and became apneic and unfortunately lost a pulse and so went through cardiac resuscitation. Unable to obtain any other recent. Unfortunate, patient found to have multiple issues including multiorgan failure with SCOTT as well as hyperkalemia and some component of rhabdomyolysis with toxic and shock liver. She is significantly hypotensive requiring multiple vasopressors. Also found to have possible pneumonia such as aspiration pneumonia.   Unable to obtain CT head due to patient's critical clinical

## 2023-06-10 NOTE — PROGRESS NOTES
on unit    Attempting [phone] no answer
 provided emotional support and ministerial presence for patient's mother Amador Cortez and sister Juana Ferraro. They are trying to get other family to hospital from [facility] and [place], including patient's 15year old daughter. Patient's mother is still hopeful for recovery.
 provided ministerial presence, prayer and comfort for family after the death of patient.
4 Eyes Skin Assessment     NAME:  Evelyn Simmons  YOB: 1985  MEDICAL RECORD NUMBER:  [id number]    The patient is being assessed for  Admission    I agree that at least one RN has performed a thorough Head to Toe Skin Assessment on the patient. ALL assessment sites listed below have been assessed. Areas assessed by both nurses:    Head, Face, Ears, Shoulders, Back, Chest, Arms, Elbows, Hands, Legs. Feet and Heels, and Under Medical Devices         Does the Patient have a Wound?  No noted wound(s)       Juan Prevention initiated by RN: Yes  Wound Care Orders initiated by RN: No    Pressure Injury (Stage 3,4, Unstageable, DTI, NWPT, and Complex wounds) if present, place Wound referral order by RN under : No    New Ostomies, if present place, Ostomy referral order under : No     Nurse 1 eSignature: Electronically signed by Kaylee Horn RN on 6/2/23 at 6:34 AM EDT    **SHARE this note so that the co-signing nurse can place an eSignature**    Nurse 2 eSignature: Electronically signed by Charis Robertson RN on 6/2/23 at 6:54 AM EDT
5240: admission to ICU. MD and NP to bedside to place vascath and art line. Patient arrived on a ventilator, no cough, no gag, pupils are not reactive. See MD notes for line placement and CRRT information. Family updated and mother asked to come to bedside.
7:31- Patient's core temp 86 this am, severely mottled, limbs stiff, RN unable to open hands. PT hypotensive with maxed out pressors, [facility] to see if CRRT could be discontinued d/t hypotension and bradycardia. 7:39- Patient flat lined on monitor code blue was called. 7:40- CRRT was stopped and RN began to return blood to patient. Several RNs and Dr. [facility] [facility] in room. Chest compressions not started per DO order. Family called, no answer RN will continue to try and contact family.
Additional Number  for Ilene called, no answer
Assessment complete, VSS with support on Levophed, Dean-synephrine, vasopressin, and epinephrine. Pt tolerating vent with no signs of distress. ETT # 7.5, 24 cm at the lip. AC/PRVC rate 28, , PEEP 8. FIO2 80%. Rhonchi bilaterally. Pulse ox having difficulty reading SPO2, drawing ABGs regularly as a spot check. Pupils nonreactive, no gag or cough reflex, Pt unable to follow commands, no sedation infusing. Skin is mottled, pulses present only to right radial and ART site at left femoral. Unable to turn Pt at this time as she is very unstable. OG in place measuring 55 cm at the lip, connected to low intermittent wall suction, pulling bloody drainage. ART to left femoral site, flushed, leveled, and zeroed per protocol. Pressors to be titrated to a MAP > 60. CRRT running without issue. Selby in place with minimal output, and rectal tube in place with minimal bloody stool. Pt's family present in room.
Attempted the following numbers to contact family      Ilene Scherer Parent  [address]?:      Primary Phone:      Home Phone: [phone]           No answer         ShannanFatimah Brother/Sister  [address]?:      Primary Phone:      Home Phone: [phone]     Mobile Phone:      Preferred Language:       Needed?:            A male answered the phone, stating this number was a company and fatimah did not work there        [address] [place] [address]?:      Primary Phone:      Home Phone: [phone]        Voice mail left at this number
BG 51, hypoglycemia protocol followed, repeat BG now 214.
Brief Neurology Note    EEG 6-2-2023  EEG DIAGNOSIS:  Severely abnormal EEG secondary to severe bihemispheric  suppression with what appears to be possible electrocerebral silence on  the left. Superimposed myogenic artifact was noted, however. I discussed EEG results with family. Not sure how much I can contribute her EEG results to her metabolic issues. Per pulmonary note, have to improve metabolic indices before pursuing brain death exam.  I did discuss that with all of her medical issues, I have a lot of concern with her prognosis. Furthermore her EEG is very concerning for brain damage. Per nursing she is not stable enough for CT H.       Valeriy Hairston MD  Neurology
Clinical Pharmacy Note  Vancomycin Consult    Martha Choudhary is a 30 y.o. female ordered Vancomycin for aspiration pneumonia; consult received from Dr. Syed Gutierrez to manage therapy. Also receiving cefepime and metronidazole. Allergies:  Patient has no known allergies. Temp max:  Temp (24hrs), Av.4 °F (38.6 °C), Min:99.4 °F (37.4 °C), Max:103.4 °F (39.7 °C)      Recent Labs     23  0732   WBC 17.4* 18.7*       Recent Labs     23  2156 23  0125 23  0350   BUN 41* 44* 33*   CREATININE 3.7* 4.5* 3.0*         Intake/Output Summary (Last 24 hours) at 2023 0811  Last data filed at 2023 8297  Gross per 24 hour   Intake 4196.62 ml   Output 619 ml   Net 3577.62 ml       Culture Results:  pending    Ht Readings from Last 1 Encounters:   23 5' 6\" (1.676 m)        Wt Readings from Last 1 Encounters:   23 250 lb 10.6 oz (113.7 kg)         Estimated Creatinine Clearance: 34 mL/min (A) (based on SCr of 3 mg/dL (H)). Assessment:  Day # 1 of vancomycin. Vancomycin 1500 mg ordered last pm in ED and appears to never have been given  Will give 1750 mg x 1 now    Currently on CRRT and high effluent rate roughly 5100 mL/hour    Random vanco tomorrow am    Thank you for the consult.      Talat GreenD.  2023  8:12 AM
EEG completed and available for interpretation on the [employer].
Family including mother and daughter visited.  provided services and patient sent to [facility]. 's office updated.
Further attempts for contacting - Numbers listed   [phone]     attempting for further numbers
Latest Reference Range & Units 06/01/23 20:21   pH, Marco 7.350 - 7.450  6.978 (LL)   pCO2, Marco 40.0 - 50.0 mmHg 60.9 (H)   pO2, Marco Not Established mmHg 57   HCO3, Venous 23 - 29 mmol/L 14 (L)     Increased set rate from 16 to 24 on ventilator
NTS mod amts of thick dark brown secretions; sat remains 94%; will cont to monitor.
Nephrology progress Note  [phone]   [facility]       Patient:  Drake Rocha   : 1985    Brief HPI  72-year-old woman with history of polysubstance abuse, HTN, PTSD, asthma, depression presented to ER with altered mental status  Patient is currently intubated and anoxic  Most the history obtained from prior documentation  She was noted to be agitated on presentation but subsequently had a cardiac arrest requiring CPR, duration of resuscitation unknown  Chest x-ray on admission with patchy opacities in the bilateral perihilar regions which could represent multifocal pneumonia  Labs on admission significant for hyperkalemia with a K of 8.6 on a hemolyzed sample, severe acidemia with a pH of 6.9, PCO2 of 60.9, WBC of 17.4, hemoglobin of 10.2, lactate of 8.7, CK of 13,000, creatinine of 3.7  We were consulted for SCOTT and need for emergent dialysis  She was started on CRRT  She was requiring 4 vasopressors, currently on 3  Remains anoxic    Subjective/interval history  Patient seen and examined  Remains intubated  Remains unresponsive  Not requiring sedation  Remains on 3 vasopressors  Hyperkalemia improving  Acidemia persistent but improving  Remains oligoanuric    ROS:   Unable to obtain    SHx:  No visitors at the bedside    Meds:  Scheduled Meds:   sodium chloride flush  5-40 mL IntraVENous 2 times per day    chlorhexidine  15 mL Mouth/Throat BID    cefepime  2,000 mg IntraVENous Q8H    methylPREDNISolone  20 mg IntraVENous Q12H    metroNIDAZOLE  250 mg IntraVENous Q8H    naloxone  0.4 mg IntraVENous Once    calcium gluconate  1,000 mg IntraVENous Once    insulin regular  10 Units IntraVENous Once    And    dextrose bolus  250 mL IntraVENous Once    sodium bicarbonate  50 mEq IntraVENous Once    albumin human 25%  50 g IntraVENous Once    sodium bicarbonate  50 mEq IntraVENous Once    vancomycin (VANCOCIN) intermittent dosing (placeholder)   Other RX Placeholder     Continuous
PICC EVALLUATION:    Arrived to place picc line, assessed chart pt coags is 16 which is to low to place PICC line. She would have severe issues with bleeding. Discussed this with bedside RN who agreed. Pt has Femoral line currently so she does have IV access to receive medications. Please let us know if labs become suitable for placement.
PT placed on PC due to high peak pressures and breaths not being delivered. NP notified. 06/02/23 1723   Patient Observation   Pulse 63   Respirations 28   SpO2   (serial ABGs from a-line per RN.  SPO2 not reading)   Vent Information   Ventilator ID [id number]   Vent Mode (S)  AC/PC   Ventilator Settings   FiO2  80 %   Insp Time (sec) 1 sec   Resp Rate (Set) 28 bmp   PEEP/CPAP (cmH2O) 8   Pressure Ordered 28   Vent Patient Data (Readings)   Vt (Measured) 339 mL   Peak Inspiratory Pressure (cmH2O) 37 cmH2O   Rate Measured 28 br/min   Minute Volume (L/min) 9.47 Liters   Mean Airway Pressure (cmH2O) 21 cmH20   Inspiratory Time 1 sec   Flow Sensitivity 3 L/min   Vent Alarm Settings   High Pressure (cmH2O) 40 cmH2O   Low Minute Volume (lpm) 3 L/min   High Minute Volume (lpm) 20 L/min   Low Exhaled Vt (ml) 100 mL   High Exhaled Vt (ml) 1000 mL   RR High (bpm) 40 br/min   Apnea (secs) 20 secs   Additional Respiratoray Assessments   Humidification Source Heated wire   Humidification Temp 36.9   Circuit Condensation Drained   Ambu Bag With Mask At Bedside Yes   Airway Clearance   Suction ET Tube;Oral   Subglottic Suction Done Yes   Suction Device Inline suction catheter   Sputum Method Obtained Endotracheal   Sputum Amount Small   Sputum Color/Odor Bloody   Sputum Consistency Thin;Thick   ETT    Placement Date/Time: 06/01/23 1847   Placed By: In ED  Placement Verified By: Auscultation;Colorimetric ETCO2 device  Airway Tube Size: 7.5 mm  Secured At: 26 cm  Measured From: Teeth   Secured At 24 cm   Measured From Lips   ETT Placement (S)  Right  (from left)   Secured By Commercial tube lipscomb   Site Assessment Drainage (comment)  (blood)   Cuff Pressure   (mov)   Ventilator Associated Pneumonia Bundle   Elevation of Head of Bed to 30-45 Degrees  No;Contraindicated   Oral Care Completed Yes   Oral Care Performed Mouthwash;Mouth swabbed;Mouth suctioned   Oral Suctioning Yes   ETT/Trach Suctioning Yes
Patient was turned to change linens and be cleaned by RN, patient tolerated well during turn but then became hypotensive. RN increased levophed to 100mcg/min and regla to 300 mcg/min. RN restarted epinephrine gtt.
Patient's mother updated RN on history before admission. She spoke with ex  that pt was with, per him she was throwing up blood Tuesday night into Wednesday morning. She felt dizzy and went to sleep, by 5pm Thursday she was unarousable and EMS called.
Patients blood pressure requiring high levels of support. This RN has been given approval by Dr. Cristian Sosa to rapidly titrate continuous blood pressure medications.
Peep increased to 8.  Dr Celestino Gamez ok with spo2 89 to 90%
Physician Progress Note      PATIENT:               Agueda Ortega  CSN #:                  [id number]  :                       1985  ADMIT DATE:       2023 6:27 PM  Jignesh Shah DATE:        6/3/2023 12:48 PM  RESPONDING  PROVIDER #:        Jennifer Collier MD          QUERY TEXT:    Patient admitted following cardiac arrest.  If possible, please document in   progress notes and discharge summary the cause of cardiac arrest:    The medical record reflects the following:  Risk Factors: hx substance abuse, asthma  Clinical Indicators: per ED  notes, presented with AMS- EMS was called the day   prior and gave Narcan - they were called again today and tell me that she was   \"not responding. \"  They gave her 4 more milligrams of Narcan she became more   responsive but was having difficulty breathing so they put her on a   nonrebreather. On arrival here she was agitated, yelling, kicking. She pulled   out the IV which was placed. It was noted however she became less responsive   and her breathing was becoming more agonal which is when I was called to the   bedside. More narcan was ordered however after  Treatment: Nacan, Intubated, CRRT, Pressors, Vasc-cath, A-line, CPR  Options provided:  -- Cardiac Arrest due to due to suspected drug overdose  -- Cardiac Arrest due to Acute Respiratory Failure  -- cardiac arrest due to other, please specify. -- Other - I will add my own diagnosis  -- Disagree - Not applicable / Not valid  -- Disagree - Clinically unable to determine / Unknown  -- Refer to Clinical Documentation Reviewer    PROVIDER RESPONSE TEXT:    This patient had cardiac arrest due suspected drug overdose.     Query created by: Krissy Garcia on 2023 2:46 PM      Electronically signed by:  Jennifer Collier MD 2023 9:32 PM
Physician Progress Note      PATIENT:               Gaetano Gupta  CSN #:                  [id number]  :                       1985  ADMIT DATE:       2023 6:27 PM  [address] [place] DATE:        6/3/2023 12:48 PM  RESPONDING  PROVIDER #:        Maris Darden MD          QUERY TEXT:    Patient admitted with Shock. Documentation reflects Sepsis in H&P note(s)   dated 23. If possible, please document in the progress notes and   discharge summary if Sepsis and septic shock was: The medical record reflects the following:  Risk Factors: Aspiration Pneumonia  Clinical Indicators: WBC 17.4, 's, lactic acid 8.7--6.8, BP  73/39 MAP   38  Treatment: ICU, IV antibiotics, Pressors  Options provided:  -- Sepsis and septic shock, POA confirmed after study  -- Sepsis and septic shock, POA treated and resolved  -- Sepsis and septic shock ruled out after study  -- Other - I will add my own diagnosis  -- Disagree - Not applicable / Not valid  -- Disagree - Clinically unable to determine / Unknown  -- Refer to Clinical Documentation Reviewer    PROVIDER RESPONSE TEXT:    Sepsis and septic shock ruled out after study.     Query created by: Suzanne Wu on 2023 8:08 AM      Electronically signed by:  Maris Darden MD 2023 1:02 PM
Pt mother called RN, was informed of patient's passing. RN explained that patient is a 's and the family can see the patient but will not be able to touch the patient. Patient's mother was tearful but polite. She stated she wanted to get answers from the 's office tests. She stated her and some family members are coming to see patient and say goodbye, she thanked RN. Good number for mother is [phone].
RN attempted to call mother multiple times to update on patient and recommend coming in to hospital asap. Also called phone in chart for sister Johnson Cannon but it was for her work and she is not in. Will attempt to call later.
Received call from ED registration stating that the patient's , Mona Britt, was at the ED registration desk and wanted to come up to see the patient. Mona Britt is not listed on the patient's emergency contact list.      Patient's mother William Friedman, remains at the bedside at this time. Per patient's mother, Mona Britt is the patient's ex- and they are not supposed to be around each other and she does not want him at the patient's bedside. This writer then called ED registration back and notified them that Mr. Leda Oakley was not permitted to visit the patient and was not listed on the emergency contact list.  Mr. Leda Oakley then spoke to this writer on the phone and requested information and was notified that information could not be released to him as he was not on the emergency contact list.    Zaki Reyna RN
TOD called by Dr. Nicci Valladares, ventilator turned off and disconnected from pt. ETT still in place.
This nurse completed handoff to [place]. All questions answered at this time.
V2.0    [facility] Progress Note      Name:  Guzman Mccauley /Age/Sex: 1985  (30 y.o. female)   MRN & CSN:  [id number] & [id number] Encounter Date/Time: 2023 3:04 PM EDT   Location:  [id number] PCP: Christie Lipscomb MD     Attending:Ger Lindsey Hidden, [address] Tabitha Hdez Day: 2    Assessment and Recommendations   Guzman Mccauley is a 30 y.o. female with pmh of  who presents with Shock ([facility] [address]    Plan:  CRRT   Neurology consult , EEG , CT head if able to , patient may have anoxic brain injury   Critical care consult   Continue vasopressors and currently patient is on 3 pressors  Continue IV fluid hydration, patient started bicarbonate IV infusion  Patient started on vancomycin/cefepime/Flagyl for broad-spectrum coverage of       Poor prognosis     Critically ill     Diet Diet NPO   DVT Prophylaxis [] Lovenox, []  Heparin, [] SCDs, [] Ambulation,  [] Eliquis, [] Xarelto  [] Coumadin   Code Status Full Code   Disposition From:   Expected Disposition:   Estimated Date of Discharge:   Patient requires continued admission due to    Surrogate Decision Maker/ POA       Personally reviewed Lab Studies and Imaging         Subjective:     Chief Complaint:     Guzman Mccauley is a 30 y.o. female who presents with       Intubated    Not on sedation     On 3 pressors +    On CRRT         Review of Systems:      Pertinent positives and negatives discussed in HPI    Objective: Intake/Output Summary (Last 24 hours) at 2023 1504  Last data filed at 2023 1413  Gross per 24 hour   Intake 6646.7 ml   Output 1675 ml   Net 4971.7 ml      Vitals:   Vitals:    23 1345 23 1400 23 1415 23 1430   BP:       Pulse: 85 86 82 82   Resp: 28 28 28 28   Temp:       TempSrc:       SpO2:       Weight:             Physical Exam:      General: intubated  Eyes: EOMI  ENT: ET to vent  Cardiovascular: Regular rate.   Respiratory: decreased air entry   Gastrointestinal: Soft, non tender  Genitourinary: no
Pulmonary

## (undated) DEVICE — CATHETER URETH ALL PURP 10 FR 2 OPP EYE RUBBER RED

## (undated) DEVICE — YANKAUER SUCTION INSTRUMENT WITHOUT CONTROL VENT, OPEN TIP, CLEAR: Brand: YANKAUER

## (undated) DEVICE — GARMENT,MEDLINE,DVT,INT,CALF,MED, GEN2: Brand: MEDLINE

## (undated) DEVICE — PACK,EENT,TURBAN DRAPE,PK II: Brand: MEDLINE

## (undated) DEVICE — MEDICINE CUP, GRADUATED, STER: Brand: MEDLINE

## (undated) DEVICE — PENCIL ES ULT VAC W TELSCP NOSE EZ CLN BLDE 10FT

## (undated) DEVICE — GLOVE ORANGE PI 7 1/2   MSG9075

## (undated) DEVICE — TOWEL,OR,DSP,ST,BLUE,DLX,8/PK,10PK/CS: Brand: MEDLINE

## (undated) DEVICE — SYRINGE,EAR/ULCER, 2 OZ, STERILE: Brand: MEDLINE

## (undated) DEVICE — TUBING, SUCTION, 1/4" X 12', STRAIGHT: Brand: MEDLINE

## (undated) DEVICE — SPONGE,TONSIL,DBL STRNG,XRAY,MED,1",STRL: Brand: MEDLINE INDUSTRIES, INC.

## (undated) DEVICE — SOLUTION BOWL: Brand: KENDALL

## (undated) DEVICE — PLATE ES AD W 9FT CRD 2

## (undated) DEVICE — ELECTROSURGICAL SUCTION COAGULATOR 10FR

## (undated) DEVICE — SOLUTION IV 1000ML 0.9% SOD CHL

## (undated) DEVICE — GAUZE,SPONGE,4"X4",16PLY,XRAY,STRL,LF: Brand: MEDLINE

## (undated) DEVICE — TURNOVER KIT RM INF CTRL TECH